# Patient Record
Sex: MALE | Race: OTHER | HISPANIC OR LATINO | URBAN - METROPOLITAN AREA
[De-identification: names, ages, dates, MRNs, and addresses within clinical notes are randomized per-mention and may not be internally consistent; named-entity substitution may affect disease eponyms.]

---

## 2020-07-22 ENCOUNTER — EMERGENCY (EMERGENCY)
Facility: HOSPITAL | Age: 30
LOS: 1 days | Discharge: ANOTHER HOSPITAL | End: 2020-07-22
Attending: EMERGENCY MEDICINE | Admitting: EMERGENCY MEDICINE
Payer: MEDICAID

## 2020-07-22 VITALS
OXYGEN SATURATION: 96 % | DIASTOLIC BLOOD PRESSURE: 91 MMHG | HEIGHT: 67 IN | TEMPERATURE: 98 F | SYSTOLIC BLOOD PRESSURE: 143 MMHG | WEIGHT: 125 LBS | HEART RATE: 77 BPM | RESPIRATION RATE: 18 BRPM

## 2020-07-22 DIAGNOSIS — R11.2 NAUSEA WITH VOMITING, UNSPECIFIED: ICD-10-CM

## 2020-07-22 DIAGNOSIS — F29 UNSPECIFIED PSYCHOSIS NOT DUE TO A SUBSTANCE OR KNOWN PHYSIOLOGICAL CONDITION: ICD-10-CM

## 2020-07-22 DIAGNOSIS — Z20.828 CONTACT WITH AND (SUSPECTED) EXPOSURE TO OTHER VIRAL COMMUNICABLE DISEASES: ICD-10-CM

## 2020-07-22 DIAGNOSIS — R10.13 EPIGASTRIC PAIN: ICD-10-CM

## 2020-07-22 DIAGNOSIS — F19.10 OTHER PSYCHOACTIVE SUBSTANCE ABUSE, UNCOMPLICATED: ICD-10-CM

## 2020-07-22 DIAGNOSIS — F48.9 NONPSYCHOTIC MENTAL DISORDER, UNSPECIFIED: ICD-10-CM

## 2020-07-22 DIAGNOSIS — R10.9 UNSPECIFIED ABDOMINAL PAIN: ICD-10-CM

## 2020-07-22 LAB
ALBUMIN SERPL ELPH-MCNC: 4.4 G/DL — SIGNIFICANT CHANGE UP (ref 3.4–5)
ALP SERPL-CCNC: 59 U/L — SIGNIFICANT CHANGE UP (ref 40–120)
ALT FLD-CCNC: 18 U/L — SIGNIFICANT CHANGE UP (ref 12–42)
ANION GAP SERPL CALC-SCNC: 12 MMOL/L — SIGNIFICANT CHANGE UP (ref 9–16)
APAP SERPL-MCNC: <2 UG/ML — LOW (ref 10–30)
AST SERPL-CCNC: 19 U/L — SIGNIFICANT CHANGE UP (ref 15–37)
BASOPHILS # BLD AUTO: 0.05 K/UL — SIGNIFICANT CHANGE UP (ref 0–0.2)
BASOPHILS NFR BLD AUTO: 0.6 % — SIGNIFICANT CHANGE UP (ref 0–2)
BILIRUB SERPL-MCNC: 0.9 MG/DL — SIGNIFICANT CHANGE UP (ref 0.2–1.2)
BUN SERPL-MCNC: 12 MG/DL — SIGNIFICANT CHANGE UP (ref 7–23)
CALCIUM SERPL-MCNC: 9.3 MG/DL — SIGNIFICANT CHANGE UP (ref 8.5–10.5)
CHLORIDE SERPL-SCNC: 104 MMOL/L — SIGNIFICANT CHANGE UP (ref 96–108)
CK SERPL-CCNC: 321 U/L — HIGH (ref 39–308)
CO2 SERPL-SCNC: 26 MMOL/L — SIGNIFICANT CHANGE UP (ref 22–31)
CREAT SERPL-MCNC: 1 MG/DL — SIGNIFICANT CHANGE UP (ref 0.5–1.3)
EOSINOPHIL # BLD AUTO: 0.07 K/UL — SIGNIFICANT CHANGE UP (ref 0–0.5)
EOSINOPHIL NFR BLD AUTO: 0.9 % — SIGNIFICANT CHANGE UP (ref 0–6)
ETHANOL SERPL-MCNC: 5 MG/DL — HIGH
GLUCOSE SERPL-MCNC: 77 MG/DL — SIGNIFICANT CHANGE UP (ref 70–99)
HCT VFR BLD CALC: 43.9 % — SIGNIFICANT CHANGE UP (ref 39–50)
HGB BLD-MCNC: 14.9 G/DL — SIGNIFICANT CHANGE UP (ref 13–17)
IMM GRANULOCYTES NFR BLD AUTO: 0.3 % — SIGNIFICANT CHANGE UP (ref 0–1.5)
LIDOCAIN IGE QN: 291 U/L — SIGNIFICANT CHANGE UP (ref 73–393)
LYMPHOCYTES # BLD AUTO: 1.37 K/UL — SIGNIFICANT CHANGE UP (ref 1–3.3)
LYMPHOCYTES # BLD AUTO: 17.5 % — SIGNIFICANT CHANGE UP (ref 13–44)
MCHC RBC-ENTMCNC: 27.4 PG — SIGNIFICANT CHANGE UP (ref 27–34)
MCHC RBC-ENTMCNC: 33.9 GM/DL — SIGNIFICANT CHANGE UP (ref 32–36)
MCV RBC AUTO: 80.8 FL — SIGNIFICANT CHANGE UP (ref 80–100)
MONOCYTES # BLD AUTO: 0.74 K/UL — SIGNIFICANT CHANGE UP (ref 0–0.9)
MONOCYTES NFR BLD AUTO: 9.5 % — SIGNIFICANT CHANGE UP (ref 2–14)
NEUTROPHILS # BLD AUTO: 5.58 K/UL — SIGNIFICANT CHANGE UP (ref 1.8–7.4)
NEUTROPHILS NFR BLD AUTO: 71.2 % — SIGNIFICANT CHANGE UP (ref 43–77)
NRBC # BLD: 0 /100 WBCS — SIGNIFICANT CHANGE UP (ref 0–0)
PCP SPEC-MCNC: SIGNIFICANT CHANGE UP
PLATELET # BLD AUTO: 307 K/UL — SIGNIFICANT CHANGE UP (ref 150–400)
POTASSIUM SERPL-MCNC: 3.8 MMOL/L — SIGNIFICANT CHANGE UP (ref 3.5–5.3)
POTASSIUM SERPL-SCNC: 3.8 MMOL/L — SIGNIFICANT CHANGE UP (ref 3.5–5.3)
PROT SERPL-MCNC: 7.4 G/DL — SIGNIFICANT CHANGE UP (ref 6.4–8.2)
RBC # BLD: 5.43 M/UL — SIGNIFICANT CHANGE UP (ref 4.2–5.8)
RBC # FLD: 12.8 % — SIGNIFICANT CHANGE UP (ref 10.3–14.5)
SALICYLATES SERPL-MCNC: 0.6 MG/DL — LOW (ref 2.8–20)
SODIUM SERPL-SCNC: 142 MMOL/L — SIGNIFICANT CHANGE UP (ref 132–145)
TSH SERPL-MCNC: 2.36 UIU/ML — SIGNIFICANT CHANGE UP (ref 0.36–3.74)
WBC # BLD: 7.83 K/UL — SIGNIFICANT CHANGE UP (ref 3.8–10.5)
WBC # FLD AUTO: 7.83 K/UL — SIGNIFICANT CHANGE UP (ref 3.8–10.5)

## 2020-07-22 PROCEDURE — 99285 EMERGENCY DEPT VISIT HI MDM: CPT

## 2020-07-22 PROCEDURE — 93010 ELECTROCARDIOGRAM REPORT: CPT

## 2020-07-22 PROCEDURE — 70450 CT HEAD/BRAIN W/O DYE: CPT | Mod: 26

## 2020-07-22 PROCEDURE — 90792 PSYCH DIAG EVAL W/MED SRVCS: CPT | Mod: 95

## 2020-07-22 RX ORDER — RISPERIDONE 4 MG/1
1 TABLET ORAL ONCE
Refills: 0 | Status: COMPLETED | OUTPATIENT
Start: 2020-07-22 | End: 2020-07-22

## 2020-07-22 RX ORDER — NICOTINE POLACRILEX 2 MG
1 GUM BUCCAL ONCE
Refills: 0 | Status: COMPLETED | OUTPATIENT
Start: 2020-07-22 | End: 2020-07-22

## 2020-07-22 RX ORDER — HALOPERIDOL DECANOATE 100 MG/ML
5 INJECTION INTRAMUSCULAR ONCE
Refills: 0 | Status: DISCONTINUED | OUTPATIENT
Start: 2020-07-22 | End: 2020-07-26

## 2020-07-22 RX ORDER — HALOPERIDOL DECANOATE 100 MG/ML
5 INJECTION INTRAMUSCULAR ONCE
Refills: 0 | Status: COMPLETED | OUTPATIENT
Start: 2020-07-22 | End: 2020-07-22

## 2020-07-22 RX ADMIN — Medication 1 PATCH: at 18:55

## 2020-07-22 RX ADMIN — Medication 2 MILLIGRAM(S): at 18:33

## 2020-07-22 RX ADMIN — HALOPERIDOL DECANOATE 5 MILLIGRAM(S): 100 INJECTION INTRAMUSCULAR at 18:33
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
## 2020-07-22 NOTE — ED BEHAVIORAL HEALTH ASSESSMENT NOTE - DESCRIPTION
per RN Yonatan, pt has been under care of number of nurses during ED course and has been under care of collateral for 2 hrs. States pt is very distrustful of staff, stated he was "afraid", suspicious of meds, not cooperative with physical exams, not comfortable around men (gets agitated and defensive when other men are in the room), nonsensical speech. Patient refused covid test, became agitated and recieved haldol 5mg/ativan 2mg IM.  Refused risperdal PO, accused RN of "trying to make me pass out", "last time I accepted a medication from you I was out for 5 days". No evidence of substance intox/withdrawal noted.    Utox +THC.    Vital Signs Last 24 Hrs  T(C): 37.1 (22 Jul 2020 17:14), Max: 37.1 (22 Jul 2020 17:14)  T(F): 98.7 (22 Jul 2020 17:14), Max: 98.7 (22 Jul 2020 17:14)  HR: 60 (22 Jul 2020 17:14) (60 - 77)  BP: 113/68 (22 Jul 2020 17:14) (113/68 - 143/91)  BP(mean): --  RR: 16 (22 Jul 2020 17:14) (16 - 18)  SpO2: 97% (22 Jul 2020 17:14) (96% - 97%) denies see HPI

## 2020-07-22 NOTE — ED ADULT NURSE NOTE - CHIEF COMPLAINT QUOTE
Patient arrives via EMS complaining of abdominal pain, nausea/vomiting, admits to alcohol use this morning (two beers) and taking several tablets of cough suppressants chlorpheniramine and dextromethorphan (approximately 16 tabs) patient with unsteady gait, was at [facility] when he was picked up

## 2020-07-22 NOTE — ED BEHAVIORAL HEALTH ASSESSMENT NOTE - ACTIVATING EVENTS/STRESSORS
Non-compliant or not receiving treatment/Inadequate social supports/Pending incarceration or homelessness/Current or pending social isolation

## 2020-07-22 NOTE — ED BEHAVIORAL HEALTH ASSESSMENT NOTE - PSYCHIATRIC ISSUES AND PLAN (INCLUDE STANDING AND PRN MEDICATION)
Start Risperdal 1mg BID. Haldol 5mg/ Ativan 2mg PO/IM q6h PRN severe agitation. If QTc <500 can start Risperdal 1mg BID. Haldol 5mg/ Ativan 2mg PO/IM q6h PRN severe agitation if CPK downtrending. Ativan 2mg/ Benadryl 50mg PO/IM q6h PRN agitation if CPK uptrending.

## 2020-07-22 NOTE — ED PROVIDER NOTE - SHIFT CHANGE DETAILS
Sign out for pending psych hospital placement 2/2 COVID-19 results. Called lab at 9:49pm who confirms they are running COVID-19 swab as a STAT lab with prelim result coming soon.
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
## 2020-07-22 NOTE — ED PROVIDER NOTE - NS ED MD SHIFT CHANGE PLANNED DISPOSITION
transfer to another facility for specialty care/pending test results documented on template/transfer to another facility, per patient's request

## 2020-07-22 NOTE — ED PROVIDER NOTE - CLINICAL SUMMARY MEDICAL DECISION MAKING FREE TEXT BOX
29 y/o male exhibiting AH and VH in context of recent ingestion of "pills" which contained chlorpheniramine and dextromethorphan. 16 doses believed to have been ingested. Will get EKG to observe for QT prolongation, toxicology labs, and psych if needed. Patient now feels better. Will observe for clinical sobriety. Patient does not exhibit any signs of hyperthermia, tachycardia, palpitations, or diaphoresis to benzos but with monitor for other signs of toxicity. Patient did not vomit currently. Given chlorpheniramine has acetaminophen toxicity, will screen for acetaminophen toxicity. 31 y/o male exhibiting AH and VH in context of presumed recent ingestion of 16x Coricidin which contains chlorpheniramine and dextromethorphan. EKG NSR with no signs of TdP or QT prolongation, tox labs unremarkable, and PCC recommending supportive care and observation with telepsych afterwards. Patient now feels better and tolerating PO. Will observe for clinical sobriety. Patient does not exhibit any signs of hyperthermia, tachycardia, palpitations, or diaphoresis to warrant intervention with benzo, but with monitor for other signs of toxicity. Patient did not vomit in the ED and has been continually drinking water and fluids.
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
## 2020-07-22 NOTE — ED ADULT TRIAGE NOTE - CHIEF COMPLAINT QUOTE
Patient arrives via EMS complaining of abdominal pain, nausea/vomiting, admits to alcohol use this morning (two beers) and taking several tablets of cough suppressants (approximately 16 tabs) patient with unsteady gait, was at [facility] when he was picked up Patient arrives via EMS complaining of abdominal pain, nausea/vomiting, admits to alcohol use this morning (two beers) and taking several tablets of cough suppressants chlorpheniramine and dextromethorphan (approximately 16 tabs) patient with unsteady gait, was at [facility] when he was picked up

## 2020-07-22 NOTE — ED BEHAVIORAL HEALTH ASSESSMENT NOTE - OTHER
multiple tattoos suspicious of writer and telepsych device at times undomiciled see HPI help seeking loose at times suspected SI/HI tactile hallucination suspected of rectum and testicles, see HPI external
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
## 2020-07-22 NOTE — ED BEHAVIORAL HEALTH ASSESSMENT NOTE - MEDICAL ISSUES AND PLAN (INCLUDE STANDING AND PRN MEDICATION)
f/u head CT scan f/u head CT scan. CPK mildly elevated, would trend given pt's recent agitation and administration of haldol.

## 2020-07-22 NOTE — ED BEHAVIORAL HEALTH ASSESSMENT NOTE - DIFFERENTIAL
psychosis not otherwise specified r/o primary psychotic disorder vs substance induced  r/o bipolar disorder

## 2020-07-22 NOTE — ED ADULT NURSE NOTE - NSIMPLEMENTINTERV_GEN_ALL_ED
Implemented All Fall with Harm Risk Interventions:  [place] to call system. Call bell, personal items and telephone within reach. Instruct patient to call for assistance. Room bathroom lighting operational. Non-slip footwear when patient is off stretcher. Physically safe environment: no spills, clutter or unnecessary equipment. Stretcher in lowest position, wheels locked, appropriate side rails in place. Provide visual cue, wrist band, yellow gown, etc. Monitor gait and stability. Monitor for mental status changes and reorient to person, place, and time. Review medications for side effects contributing to fall risk. Reinforce activity limits and safety measures with patient and family. Provide visual clues: red socks.

## 2020-07-22 NOTE — ED BEHAVIORAL HEALTH ASSESSMENT NOTE - VIOLENCE RISK FACTORS:
Noncompliance with treatment/Community stressors that increase the risk of destabilization/Substance abuse/Impulsivity/History of being victimized/traumatized/Command hallucinations for violent behavior

## 2020-07-22 NOTE — ED BEHAVIORAL HEALTH ASSESSMENT NOTE - AXIS IV
Problems with primary support/Economic problems/Problem related to social environment/Occupational problems/Housing problems

## 2020-07-22 NOTE — ED BEHAVIORAL HEALTH ASSESSMENT NOTE - LEGAL HISTORY
states that he is dealing with legal issues in [place]?- states that he and his ex-fiance went to [place] to have a baby but that she "fled with the child" after he was born. Patient not forthcoming with questions about CPS involvement

## 2020-07-22 NOTE — ED ADULT NURSE NOTE - OBJECTIVE STATEMENT
29 yo M presents to ED for AMS. Pt state "I was drinking and then someone told me to take pills because it would be better than drinking and then I got sweaty and dizzy and vomited a lot". Pt denies CP, SOB at this time. Fall precautions initiated. Pt has no sign or respiratory distress noted or sign of trauma noted to head or body.

## 2020-07-22 NOTE — ED BEHAVIORAL HEALTH ASSESSMENT NOTE - SUICIDE RISK FACTORS
Command hallucinations/Alcohol/Substance abuse disorders/Impulsivity/Access to lethal methods (pills, firearm, etc.: Ask specifically about presence or absence of a firearm in the home or ease of accessing/Agitation/Severe Anxiety/Panic/Unable to engage in safety planning/History of abuse/trauma/Insomnia
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
## 2020-07-22 NOTE — ED PROVIDER NOTE - PROGRESS NOTE DETAILS
Called toxicology center. Recommended to monitor and observe for the next 2-4 hours. Pt signed out by Dr Lipscomb, pending admission to Psychiatry for psychosis symptoms. Legals were filled out by Dr Lipscomb. Called Psych team for update, they are looking for bed locations for pt, no male beds tonight. Likely to be signed out in AM pending bed assignment. COVID result also not available as reagent is scarce right now. new FS borderline, will provide food and drink. Pt awake and able to eat/drink.

## 2020-07-23 ENCOUNTER — INPATIENT (INPATIENT)
Facility: HOSPITAL | Age: 30
LOS: 7 days | Discharge: HOME | End: 2020-07-31
Attending: PSYCHIATRY & NEUROLOGY | Admitting: PSYCHIATRY & NEUROLOGY
Payer: MEDICAID

## 2020-07-23 VITALS — WEIGHT: 134.92 LBS | TEMPERATURE: 98 F | HEIGHT: 66 IN

## 2020-07-23 VITALS
OXYGEN SATURATION: 98 % | TEMPERATURE: 98 F | DIASTOLIC BLOOD PRESSURE: 79 MMHG | RESPIRATION RATE: 16 BRPM | SYSTOLIC BLOOD PRESSURE: 120 MMHG | HEART RATE: 83 BPM

## 2020-07-23 DIAGNOSIS — F29 UNSPECIFIED PSYCHOSIS NOT DUE TO A SUBSTANCE OR KNOWN PHYSIOLOGICAL CONDITION: ICD-10-CM

## 2020-07-23 LAB
LITHIUM SERPL-MCNC: <0.2 MMOL/L — LOW (ref 0.6–1.2)
SARS-COV-2 RNA SPEC QL NAA+PROBE: SIGNIFICANT CHANGE UP

## 2020-07-23 PROCEDURE — 93010 ELECTROCARDIOGRAM REPORT: CPT

## 2020-07-23 RX ORDER — DIPHENHYDRAMINE HCL 50 MG
50 CAPSULE ORAL ONCE
Refills: 0 | Status: DISCONTINUED | OUTPATIENT
Start: 2020-07-23 | End: 2020-07-31

## 2020-07-23 RX ORDER — THIAMINE MONONITRATE (VIT B1) 100 MG
100 TABLET ORAL DAILY
Refills: 0 | Status: COMPLETED | OUTPATIENT
Start: 2020-07-23 | End: 2020-07-26

## 2020-07-23 RX ORDER — RISPERIDONE 4 MG/1
1 TABLET ORAL
Refills: 0 | Status: DISCONTINUED | OUTPATIENT
Start: 2020-07-23 | End: 2020-07-24

## 2020-07-23 RX ORDER — NICOTINE POLACRILEX 2 MG
2 GUM BUCCAL
Refills: 0 | Status: DISCONTINUED | OUTPATIENT
Start: 2020-07-23 | End: 2020-07-31

## 2020-07-23 RX ORDER — FOLIC ACID 0.8 MG
1 TABLET ORAL DAILY
Refills: 0 | Status: COMPLETED | OUTPATIENT
Start: 2020-07-23 | End: 2020-07-30

## 2020-07-23 RX ORDER — HALOPERIDOL DECANOATE 100 MG/ML
5 INJECTION INTRAMUSCULAR ONCE
Refills: 0 | Status: DISCONTINUED | OUTPATIENT
Start: 2020-07-23 | End: 2020-07-31

## 2020-07-23 RX ORDER — DIPHENHYDRAMINE HCL 50 MG
50 CAPSULE ORAL EVERY 6 HOURS
Refills: 0 | Status: DISCONTINUED | OUTPATIENT
Start: 2020-07-23 | End: 2020-07-31

## 2020-07-23 RX ORDER — HALOPERIDOL DECANOATE 100 MG/ML
5 INJECTION INTRAMUSCULAR EVERY 6 HOURS
Refills: 0 | Status: DISCONTINUED | OUTPATIENT
Start: 2020-07-23 | End: 2020-07-31

## 2020-07-23 RX ADMIN — Medication 2 MILLIGRAM(S): at 17:41

## 2020-07-23 RX ADMIN — RISPERIDONE 1 MILLIGRAM(S): 4 TABLET ORAL at 21:27

## 2020-07-23 NOTE — ED ADULT NURSE REASSESSMENT NOTE - NS ED NURSE REASSESS COMMENT FT1
EMS at bedside to transfer pt to [facility]. Belongings given to EMS by Security. Pt calm at this time. NO IV in place. Telepsych called for [place].
Patient oriented and informed about the necessity to do the swab test (covid-19) but pt. refused. MD. made aware PT given 5 mg haloperidol + 2 mg lorazepam for anxiety. Pt 1/1 outside the room.
Patient sleeping in stretcher. Patient is calm, cooperative, in nad. Pending AM telepsych eval, 1:1 observation maintained.
Pt UA and Utox rvwd.  Pt pending psych consult.
Pt awaiting telepsych consult.  Pt A&Ox3, NAD.
Pt telepsych consult at bedside.  PCT Cisyln at bedside as per protocol.
RN spoke w/ admitting psych team.  Pt asleep on stretcher, arousable to RN questions.  One-to-one in place.  Will continue to reassess and reevaluate.
Received patient from TAYLOR Conte. Patient is currently sleeping in stretcher, in nad, respirations even bilaterally. Not endorsing any complaints at this time, 1:1 observation and safety measures in place. Pending bed for admission, will continue to monitor.
Report received by Nhung then check the patient at the room 07. Pt. no complaints at this moment, no apparent distress Pt. A&0x3, no fever, no pain. Explain for the patient that he need to do the urine test. Pt. remains on observation under nurses care.
This RN is assuming care from TAYLOR Quintero. Patient is resting in bed in NAD. 1 to 1 supervision is at door in direct eye sight of patient. Will continue to assess.
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
## 2020-07-23 NOTE — CHART NOTE - NSCHARTNOTEFT_GEN_A_CORE
Screening Medical Evaluation  Patient Admitted from: [facility] admitting diagnosis: Non-organic psychosis    PAST MEDICAL & SURGICAL HISTORY:        Allergies    No Known Allergies    Intolerances        Social History:     FAMILY HISTORY:      MEDICATIONS  (STANDING):  folic acid 1 milliGRAM(s) Oral daily  multivitamin 1 Tablet(s) Oral daily  risperiDONE   Tablet 1 milliGRAM(s) Oral two times a day  thiamine 100 milliGRAM(s) Oral daily    MEDICATIONS  (PRN):  diphenhydrAMINE 50 milliGRAM(s) Oral every 6 hours PRN agitation/EPS prophylaxis  diphenhydrAMINE   Injectable 50 milliGRAM(s) IntraMuscular once PRN severe agitation/EPS prophylaxis  haloperidol     Tablet 5 milliGRAM(s) Oral every 6 hours PRN agitation  haloperidol    Injectable 5 milliGRAM(s) IntraMuscular once PRN severe agitation  LORazepam     Tablet 2 milliGRAM(s) Oral every 6 hours PRN agitation  LORazepam     Tablet 2 milliGRAM(s) Oral every 2 hours PRN CIWA score increase by 2 points and current CIWA score GREATER THAN 9  LORazepam   Injectable 2 milliGRAM(s) IntraMuscular once PRN severe agitation  nicotine  Polacrilex Gum 2 milliGRAM(s) Oral every 3 hours PRN nicotine craving      Vital Signs Last 24 Hrs  T(C): 36.8 (23 Jul 2020 18:57), Max: 36.8 (23 Jul 2020 18:57)  T(F): 98.3 (23 Jul 2020 18:57), Max: 98.3 (23 Jul 2020 18:57)  HR: -- 86  BP: -- 109/78  BP(mean): --  RR: --  SpO2: --  CAPILLARY BLOOD GLUCOSE            PHYSICAL EXAM:  GENERAL: NAD, well-developed  HEAD:  Atraumatic, Normocephalic  EYES: EOMI, PERRLA, conjunctiva and sclera clear  NECK: Supple, No JVD  CHEST/LUNG: Clear to auscultation bilaterally; No wheeze  HEART: Regular rate and rhythm; No murmurs, rubs, or gallops  ABDOMEN: Soft, Nontender, Nondistended; Bowel sounds present  EXTREMITIES:  2+ Peripheral Pulses, No clubbing, cyanosis, or edema  PSYCH: AAOx3  NEUROLOGY: non-focal  SKIN: In tact    LABS:                    RADIOLOGY & ADDITIONAL TESTS:    Assessment and Plan: 31 yo M with no significant PMH is admitted to [facility] with a primary psychiatric diagnosis of Non-organic psychosis. The pt currently denies having any medical complaints such as chest pain, sob, abdominal pain, n/v/d/c, or any problems with urination or bowel movements. The rest of his screening physical is unremarkable.    1.Non-organic psychosis-Plan: continue with meds as per primary psychiatric team

## 2020-07-24 DIAGNOSIS — M62.82 RHABDOMYOLYSIS: ICD-10-CM

## 2020-07-24 DIAGNOSIS — F28 OTHER PSYCHOTIC DISORDER NOT DUE TO A SUBSTANCE OR KNOWN PHYSIOLOGICAL CONDITION: ICD-10-CM

## 2020-07-24 LAB
CHOLEST SERPL-MCNC: 158 MG/DL — SIGNIFICANT CHANGE UP (ref 120–199)
CK SERPL-CCNC: 1010 U/L — HIGH (ref 30–200)
HBA1C BLD-MCNC: 5.7 % — HIGH (ref 4–5.6)
HDLC SERPL-MCNC: 38 MG/DL — SIGNIFICANT CHANGE UP (ref 35–55)
LIPID PNL WITH DIRECT LDL SERPL: 105 MG/DL — SIGNIFICANT CHANGE UP
TRIGL SERPL-MCNC: 87 MG/DL — SIGNIFICANT CHANGE UP (ref 10–149)

## 2020-07-24 PROCEDURE — 99223 1ST HOSP IP/OBS HIGH 75: CPT

## 2020-07-24 PROCEDURE — 99232 SBSQ HOSP IP/OBS MODERATE 35: CPT | Mod: GC

## 2020-07-24 RX ORDER — RISPERIDONE 4 MG/1
2 TABLET ORAL AT BEDTIME
Refills: 0 | Status: DISCONTINUED | OUTPATIENT
Start: 2020-07-24 | End: 2020-07-28

## 2020-07-24 RX ORDER — RISPERIDONE 4 MG/1
1 TABLET ORAL DAILY
Refills: 0 | Status: DISCONTINUED | OUTPATIENT
Start: 2020-07-24 | End: 2020-07-26

## 2020-07-24 RX ADMIN — Medication 100 MILLIGRAM(S): at 08:52

## 2020-07-24 RX ADMIN — Medication 2 MILLIGRAM(S): at 16:27

## 2020-07-24 RX ADMIN — Medication 1 TABLET(S): at 08:52

## 2020-07-24 RX ADMIN — RISPERIDONE 2 MILLIGRAM(S): 4 TABLET ORAL at 20:10

## 2020-07-24 RX ADMIN — RISPERIDONE 1 MILLIGRAM(S): 4 TABLET ORAL at 08:52

## 2020-07-24 RX ADMIN — Medication 1 MILLIGRAM(S): at 08:52

## 2020-07-24 NOTE — CONSULT NOTE ADULT - ASSESSMENT
30M with psychosis found to have uptrending cpk likely due to walking for several hours prior to hospitalization.

## 2020-07-25 LAB
ANION GAP SERPL CALC-SCNC: 9 MMO/L — SIGNIFICANT CHANGE UP (ref 7–14)
BUN SERPL-MCNC: 12 MG/DL — SIGNIFICANT CHANGE UP (ref 7–23)
CALCIUM SERPL-MCNC: 8.8 MG/DL — SIGNIFICANT CHANGE UP (ref 8.4–10.5)
CHLORIDE SERPL-SCNC: 99 MMOL/L — SIGNIFICANT CHANGE UP (ref 98–107)
CK SERPL-CCNC: 662 U/L — HIGH (ref 30–200)
CO2 SERPL-SCNC: 29 MMOL/L — SIGNIFICANT CHANGE UP (ref 22–31)
CREAT SERPL-MCNC: 0.95 MG/DL — SIGNIFICANT CHANGE UP (ref 0.5–1.3)
GLUCOSE SERPL-MCNC: 145 MG/DL — HIGH (ref 70–99)
POTASSIUM SERPL-MCNC: 3.8 MMOL/L — SIGNIFICANT CHANGE UP (ref 3.5–5.3)
POTASSIUM SERPL-SCNC: 3.8 MMOL/L — SIGNIFICANT CHANGE UP (ref 3.5–5.3)
SODIUM SERPL-SCNC: 137 MMOL/L — SIGNIFICANT CHANGE UP (ref 135–145)

## 2020-07-25 PROCEDURE — 99232 SBSQ HOSP IP/OBS MODERATE 35: CPT

## 2020-07-25 RX ORDER — NICOTINE POLACRILEX 2 MG
1 GUM BUCCAL ONCE
Refills: 0 | Status: COMPLETED | OUTPATIENT
Start: 2020-07-25 | End: 2020-07-25

## 2020-07-25 RX ORDER — NICOTINE POLACRILEX 2 MG
1 GUM BUCCAL DAILY
Refills: 0 | Status: DISCONTINUED | OUTPATIENT
Start: 2020-07-25 | End: 2020-07-31

## 2020-07-25 RX ORDER — DIPHENHYDRAMINE HCL 50 MG
50 CAPSULE ORAL ONCE
Refills: 0 | Status: COMPLETED | OUTPATIENT
Start: 2020-07-25 | End: 2020-07-25

## 2020-07-25 RX ORDER — DIPHENHYDRAMINE HCL 50 MG
50 CAPSULE ORAL ONCE
Refills: 0 | Status: DISCONTINUED | OUTPATIENT
Start: 2020-07-25 | End: 2020-07-27

## 2020-07-25 RX ORDER — HALOPERIDOL DECANOATE 100 MG/ML
5 INJECTION INTRAMUSCULAR ONCE
Refills: 0 | Status: DISCONTINUED | OUTPATIENT
Start: 2020-07-25 | End: 2020-07-27

## 2020-07-25 RX ADMIN — Medication 2 MILLIGRAM(S): at 12:35

## 2020-07-25 RX ADMIN — Medication 1 PATCH: at 08:17

## 2020-07-25 RX ADMIN — Medication 50 MILLIGRAM(S): at 12:36

## 2020-07-25 RX ADMIN — Medication 2 MILLIGRAM(S): at 08:12

## 2020-07-25 RX ADMIN — RISPERIDONE 2 MILLIGRAM(S): 4 TABLET ORAL at 20:12

## 2020-07-25 RX ADMIN — Medication 1 MILLIGRAM(S): at 08:02

## 2020-07-25 RX ADMIN — HALOPERIDOL DECANOATE 5 MILLIGRAM(S): 100 INJECTION INTRAMUSCULAR at 19:15

## 2020-07-25 RX ADMIN — Medication 1 PATCH: at 20:12

## 2020-07-25 RX ADMIN — Medication 50 MILLIGRAM(S): at 19:15

## 2020-07-25 RX ADMIN — Medication 1 TABLET(S): at 08:03

## 2020-07-25 RX ADMIN — Medication 2 MILLIGRAM(S): at 12:36

## 2020-07-25 RX ADMIN — Medication 2 MILLIGRAM(S): at 19:15

## 2020-07-25 RX ADMIN — Medication 50 MILLIGRAM(S): at 22:12

## 2020-07-25 RX ADMIN — Medication 100 MILLIGRAM(S): at 08:03

## 2020-07-25 RX ADMIN — RISPERIDONE 1 MILLIGRAM(S): 4 TABLET ORAL at 08:03

## 2020-07-25 RX ADMIN — HALOPERIDOL DECANOATE 5 MILLIGRAM(S): 100 INJECTION INTRAMUSCULAR at 12:36

## 2020-07-26 LAB — HIV 1+2 AB+HIV1 P24 AG SERPL QL IA: SIGNIFICANT CHANGE UP

## 2020-07-26 PROCEDURE — 99232 SBSQ HOSP IP/OBS MODERATE 35: CPT

## 2020-07-26 RX ORDER — RISPERIDONE 4 MG/1
2 TABLET ORAL DAILY
Refills: 0 | Status: DISCONTINUED | OUTPATIENT
Start: 2020-07-27 | End: 2020-07-27

## 2020-07-26 RX ORDER — IBUPROFEN 200 MG
600 TABLET ORAL EVERY 6 HOURS
Refills: 0 | Status: DISCONTINUED | OUTPATIENT
Start: 2020-07-26 | End: 2020-07-28

## 2020-07-26 RX ADMIN — Medication 2 MILLIGRAM(S): at 13:45

## 2020-07-26 RX ADMIN — HALOPERIDOL DECANOATE 5 MILLIGRAM(S): 100 INJECTION INTRAMUSCULAR at 13:43

## 2020-07-26 RX ADMIN — Medication 30 MILLILITER(S): at 01:11

## 2020-07-26 RX ADMIN — RISPERIDONE 1 MILLIGRAM(S): 4 TABLET ORAL at 09:41

## 2020-07-26 RX ADMIN — Medication 1 PATCH: at 09:43

## 2020-07-26 RX ADMIN — Medication 50 MILLIGRAM(S): at 23:00

## 2020-07-26 RX ADMIN — Medication 2 MILLIGRAM(S): at 23:00

## 2020-07-26 RX ADMIN — RISPERIDONE 2 MILLIGRAM(S): 4 TABLET ORAL at 20:40

## 2020-07-26 RX ADMIN — Medication 2 MILLIGRAM(S): at 09:42

## 2020-07-26 RX ADMIN — Medication 600 MILLIGRAM(S): at 17:21

## 2020-07-26 RX ADMIN — Medication 1 PATCH: at 09:41

## 2020-07-26 RX ADMIN — Medication 600 MILLIGRAM(S): at 16:20

## 2020-07-26 RX ADMIN — Medication 1 TABLET(S): at 09:43

## 2020-07-26 RX ADMIN — Medication 1 PATCH: at 09:42

## 2020-07-26 RX ADMIN — Medication 2 MILLIGRAM(S): at 13:44

## 2020-07-26 RX ADMIN — Medication 50 MILLIGRAM(S): at 13:43

## 2020-07-26 RX ADMIN — Medication 1 MILLIGRAM(S): at 09:43

## 2020-07-26 RX ADMIN — Medication 100 MILLIGRAM(S): at 09:41

## 2020-07-27 PROCEDURE — 99232 SBSQ HOSP IP/OBS MODERATE 35: CPT | Mod: GC

## 2020-07-27 RX ORDER — HALOPERIDOL DECANOATE 100 MG/ML
5 INJECTION INTRAMUSCULAR ONCE
Refills: 0 | Status: DISCONTINUED | OUTPATIENT
Start: 2020-07-27 | End: 2020-07-31

## 2020-07-27 RX ORDER — CYCLOBENZAPRINE HYDROCHLORIDE 10 MG/1
5 TABLET, FILM COATED ORAL ONCE
Refills: 0 | Status: COMPLETED | OUTPATIENT
Start: 2020-07-27 | End: 2020-07-27

## 2020-07-27 RX ORDER — RISPERIDONE 4 MG/1
1 TABLET ORAL DAILY
Refills: 0 | Status: DISCONTINUED | OUTPATIENT
Start: 2020-07-28 | End: 2020-07-29

## 2020-07-27 RX ORDER — DIPHENHYDRAMINE HCL 50 MG
50 CAPSULE ORAL ONCE
Refills: 0 | Status: DISCONTINUED | OUTPATIENT
Start: 2020-07-27 | End: 2020-07-31

## 2020-07-27 RX ADMIN — Medication 1 PATCH: at 09:21

## 2020-07-27 RX ADMIN — Medication 2 MILLIGRAM(S): at 09:24

## 2020-07-27 RX ADMIN — Medication 2 MILLIGRAM(S): at 00:02

## 2020-07-27 RX ADMIN — Medication 1 PATCH: at 11:28

## 2020-07-27 RX ADMIN — Medication 2 MILLIGRAM(S): at 12:40

## 2020-07-27 RX ADMIN — RISPERIDONE 2 MILLIGRAM(S): 4 TABLET ORAL at 09:28

## 2020-07-27 RX ADMIN — Medication 2 MILLIGRAM(S): at 21:30

## 2020-07-27 RX ADMIN — Medication 50 MILLIGRAM(S): at 21:30

## 2020-07-27 RX ADMIN — RISPERIDONE 2 MILLIGRAM(S): 4 TABLET ORAL at 21:18

## 2020-07-27 RX ADMIN — Medication 1 MILLIGRAM(S): at 09:21

## 2020-07-27 RX ADMIN — Medication 1 TABLET(S): at 09:21

## 2020-07-27 RX ADMIN — HALOPERIDOL DECANOATE 5 MILLIGRAM(S): 100 INJECTION INTRAMUSCULAR at 21:30

## 2020-07-27 RX ADMIN — HALOPERIDOL DECANOATE 5 MILLIGRAM(S): 100 INJECTION INTRAMUSCULAR at 00:02

## 2020-07-28 PROCEDURE — 90832 PSYTX W PT 30 MINUTES: CPT

## 2020-07-28 PROCEDURE — 99232 SBSQ HOSP IP/OBS MODERATE 35: CPT | Mod: GC

## 2020-07-28 RX ORDER — ACETAMINOPHEN 500 MG
650 TABLET ORAL EVERY 6 HOURS
Refills: 0 | Status: DISCONTINUED | OUTPATIENT
Start: 2020-07-28 | End: 2020-07-31

## 2020-07-28 RX ORDER — IBUPROFEN 200 MG
600 TABLET ORAL EVERY 6 HOURS
Refills: 0 | Status: DISCONTINUED | OUTPATIENT
Start: 2020-07-28 | End: 2020-07-31

## 2020-07-28 RX ORDER — RISPERIDONE 4 MG/1
3 TABLET ORAL AT BEDTIME
Refills: 0 | Status: DISCONTINUED | OUTPATIENT
Start: 2020-07-28 | End: 2020-07-29

## 2020-07-28 RX ADMIN — CYCLOBENZAPRINE HYDROCHLORIDE 5 MILLIGRAM(S): 10 TABLET, FILM COATED ORAL at 00:18

## 2020-07-28 RX ADMIN — RISPERIDONE 1 MILLIGRAM(S): 4 TABLET ORAL at 10:01

## 2020-07-28 RX ADMIN — Medication 1 MILLIGRAM(S): at 10:01

## 2020-07-28 RX ADMIN — Medication 1 PATCH: at 10:01

## 2020-07-28 RX ADMIN — Medication 600 MILLIGRAM(S): at 10:06

## 2020-07-28 RX ADMIN — Medication 50 MILLIGRAM(S): at 19:30

## 2020-07-28 RX ADMIN — Medication 2 MILLIGRAM(S): at 19:30

## 2020-07-28 RX ADMIN — HALOPERIDOL DECANOATE 5 MILLIGRAM(S): 100 INJECTION INTRAMUSCULAR at 19:30

## 2020-07-28 RX ADMIN — RISPERIDONE 3 MILLIGRAM(S): 4 TABLET ORAL at 20:07

## 2020-07-28 RX ADMIN — Medication 1 TABLET(S): at 10:01

## 2020-07-29 PROCEDURE — 99232 SBSQ HOSP IP/OBS MODERATE 35: CPT | Mod: GC

## 2020-07-29 RX ORDER — TUBERCULIN PURIFIED PROTEIN DERIVATIVE 5 [IU]/.1ML
5 INJECTION, SOLUTION INTRADERMAL ONCE
Refills: 0 | Status: COMPLETED | OUTPATIENT
Start: 2020-07-29 | End: 2020-07-30

## 2020-07-29 RX ORDER — RISPERIDONE 4 MG/1
3 TABLET ORAL AT BEDTIME
Refills: 0 | Status: COMPLETED | OUTPATIENT
Start: 2020-07-29 | End: 2020-07-29

## 2020-07-29 RX ORDER — RISPERIDONE 4 MG/1
4 TABLET ORAL AT BEDTIME
Refills: 0 | Status: DISCONTINUED | OUTPATIENT
Start: 2020-07-30 | End: 2020-07-31

## 2020-07-29 RX ADMIN — RISPERIDONE 3 MILLIGRAM(S): 4 TABLET ORAL at 21:08

## 2020-07-29 RX ADMIN — RISPERIDONE 1 MILLIGRAM(S): 4 TABLET ORAL at 10:41

## 2020-07-29 RX ADMIN — Medication 50 MILLIGRAM(S): at 22:10

## 2020-07-29 RX ADMIN — Medication 1 TABLET(S): at 10:41

## 2020-07-29 RX ADMIN — Medication 1 MILLIGRAM(S): at 10:41

## 2020-07-30 PROCEDURE — 90832 PSYTX W PT 30 MINUTES: CPT

## 2020-07-30 PROCEDURE — 99232 SBSQ HOSP IP/OBS MODERATE 35: CPT | Mod: GC

## 2020-07-30 RX ORDER — RISPERIDONE 4 MG/1
1 TABLET ORAL
Qty: 14 | Refills: 0
Start: 2020-07-30 | End: 2020-08-12

## 2020-07-30 RX ADMIN — Medication 1 TABLET(S): at 08:05

## 2020-07-30 RX ADMIN — RISPERIDONE 4 MILLIGRAM(S): 4 TABLET ORAL at 20:03

## 2020-07-30 RX ADMIN — Medication 1 MILLIGRAM(S): at 08:05

## 2020-07-30 RX ADMIN — TUBERCULIN PURIFIED PROTEIN DERIVATIVE 5 UNIT(S): 5 INJECTION, SOLUTION INTRADERMAL at 10:05

## 2020-07-31 VITALS — TEMPERATURE: 98 F

## 2020-07-31 PROCEDURE — 99239 HOSP IP/OBS DSCHRG MGMT >30: CPT | Mod: GC

## 2022-03-01 NOTE — ED BEHAVIORAL HEALTH ASSESSMENT NOTE - NS ED BHA HOMICIDALITY PRESENT AGGRESSION PROPERTY LIFETIME
Go for blood tests as directed. Your doctor will do lab tests at regular visits to monitor the effects of this medicine. Please follow up with your doctor and keep your health care provider appointments.
Unable to assess

## 2022-05-06 ENCOUNTER — INPATIENT (INPATIENT)
Facility: HOSPITAL | Age: 32
LOS: 2 days | Discharge: HOME | DRG: 885 | End: 2022-05-09
Attending: PSYCHIATRY & NEUROLOGY | Admitting: PSYCHIATRY & NEUROLOGY
Payer: MEDICAID

## 2022-05-06 VITALS
OXYGEN SATURATION: 98 % | SYSTOLIC BLOOD PRESSURE: 134 MMHG | DIASTOLIC BLOOD PRESSURE: 65 MMHG | TEMPERATURE: 98 F | HEIGHT: 67 IN | HEART RATE: 70 BPM | RESPIRATION RATE: 18 BRPM

## 2022-05-06 DIAGNOSIS — F14.10 COCAINE ABUSE, UNCOMPLICATED: ICD-10-CM

## 2022-05-06 LAB
ALBUMIN SERPL ELPH-MCNC: 4.2 G/DL — SIGNIFICANT CHANGE UP (ref 3.4–5)
ALP SERPL-CCNC: 53 U/L — SIGNIFICANT CHANGE UP (ref 40–120)
ALT FLD-CCNC: 28 U/L — SIGNIFICANT CHANGE UP (ref 12–42)
AMPHET UR-MCNC: NEGATIVE — SIGNIFICANT CHANGE UP
ANION GAP SERPL CALC-SCNC: 7 MMOL/L — LOW (ref 9–16)
AST SERPL-CCNC: 25 U/L — SIGNIFICANT CHANGE UP (ref 15–37)
BARBITURATES UR SCN-MCNC: NEGATIVE — SIGNIFICANT CHANGE UP
BENZODIAZ UR-MCNC: NEGATIVE — SIGNIFICANT CHANGE UP
BILIRUB SERPL-MCNC: 0.6 MG/DL — SIGNIFICANT CHANGE UP (ref 0.2–1.2)
BUN SERPL-MCNC: 13 MG/DL — SIGNIFICANT CHANGE UP (ref 7–23)
CALCIUM SERPL-MCNC: 9 MG/DL — SIGNIFICANT CHANGE UP (ref 8.5–10.5)
CHLORIDE SERPL-SCNC: 100 MMOL/L — SIGNIFICANT CHANGE UP (ref 96–108)
CO2 SERPL-SCNC: 32 MMOL/L — HIGH (ref 22–31)
COCAINE METAB.OTHER UR-MCNC: POSITIVE
CREAT SERPL-MCNC: 1.01 MG/DL — SIGNIFICANT CHANGE UP (ref 0.5–1.3)
EGFR: 101 ML/MIN/1.73M2 — SIGNIFICANT CHANGE UP
ETHANOL SERPL-MCNC: <3 MG/DL — SIGNIFICANT CHANGE UP
GLUCOSE SERPL-MCNC: 83 MG/DL — SIGNIFICANT CHANGE UP (ref 70–99)
HCT VFR BLD CALC: 44.3 % — SIGNIFICANT CHANGE UP (ref 39–50)
HGB BLD-MCNC: 14.5 G/DL — SIGNIFICANT CHANGE UP (ref 13–17)
MCHC RBC-ENTMCNC: 27.6 PG — SIGNIFICANT CHANGE UP (ref 27–34)
MCHC RBC-ENTMCNC: 32.7 GM/DL — SIGNIFICANT CHANGE UP (ref 32–36)
MCV RBC AUTO: 84.4 FL — SIGNIFICANT CHANGE UP (ref 80–100)
METHADONE UR-MCNC: NEGATIVE — SIGNIFICANT CHANGE UP
NRBC # BLD: 0 /100 WBCS — SIGNIFICANT CHANGE UP (ref 0–0)
OPIATES UR-MCNC: NEGATIVE — SIGNIFICANT CHANGE UP
PCP SPEC-MCNC: SIGNIFICANT CHANGE UP
PCP UR-MCNC: NEGATIVE — SIGNIFICANT CHANGE UP
PLATELET # BLD AUTO: 294 K/UL — SIGNIFICANT CHANGE UP (ref 150–400)
POTASSIUM SERPL-MCNC: 3.8 MMOL/L — SIGNIFICANT CHANGE UP (ref 3.5–5.3)
POTASSIUM SERPL-SCNC: 3.8 MMOL/L — SIGNIFICANT CHANGE UP (ref 3.5–5.3)
PROT SERPL-MCNC: 7.3 G/DL — SIGNIFICANT CHANGE UP (ref 6.4–8.2)
RBC # BLD: 5.25 M/UL — SIGNIFICANT CHANGE UP (ref 4.2–5.8)
RBC # FLD: 13.2 % — SIGNIFICANT CHANGE UP (ref 10.3–14.5)
SARS-COV-2 RNA SPEC QL NAA+PROBE: SIGNIFICANT CHANGE UP
SODIUM SERPL-SCNC: 139 MMOL/L — SIGNIFICANT CHANGE UP (ref 132–145)
THC UR QL: POSITIVE
WBC # BLD: 8.12 K/UL — SIGNIFICANT CHANGE UP (ref 3.8–10.5)
WBC # FLD AUTO: 8.12 K/UL — SIGNIFICANT CHANGE UP (ref 3.8–10.5)

## 2022-05-06 PROCEDURE — 99285 EMERGENCY DEPT VISIT HI MDM: CPT

## 2022-05-06 PROCEDURE — 90792 PSYCH DIAG EVAL W/MED SRVCS: CPT | Mod: 95

## 2022-05-06 RX ORDER — HALOPERIDOL DECANOATE 100 MG/ML
5 INJECTION INTRAMUSCULAR EVERY 6 HOURS
Refills: 0 | Status: DISCONTINUED | OUTPATIENT
Start: 2022-05-06 | End: 2022-05-09

## 2022-05-06 RX ORDER — OLANZAPINE 15 MG/1
5 TABLET, FILM COATED ORAL ONCE
Refills: 0 | Status: COMPLETED | OUTPATIENT
Start: 2022-05-06 | End: 2022-05-07

## 2022-05-06 RX ORDER — DIPHENHYDRAMINE HCL 50 MG
50 CAPSULE ORAL EVERY 6 HOURS
Refills: 0 | Status: DISCONTINUED | OUTPATIENT
Start: 2022-05-06 | End: 2022-05-09

## 2022-05-06 RX ORDER — OLANZAPINE 15 MG/1
5 TABLET, FILM COATED ORAL ONCE
Refills: 0 | Status: COMPLETED | OUTPATIENT
Start: 2022-05-06 | End: 2022-05-06

## 2022-05-06 RX ADMIN — OLANZAPINE 5 MILLIGRAM(S): 15 TABLET, FILM COATED ORAL at 12:15

## 2022-05-06 NOTE — ED PROVIDER NOTE - NS ED ROS FT
· CONSTITUTIONAL: no fever and no chills.  · EYES: no visual changes  · ENMT: no change/loss in taste  · CARDIOVASCULAR: no cp  · RESPIRATORY: no sob,   · GASTROINTESTINAL: no pain, no nvd  · MUSCULOSKELETAL: no pain  · SKIN: no bleeding  · ENDOCRINE: no DM  · NEURO: no headache  · PSYCH: AH, depression, paranoia

## 2022-05-06 NOTE — ED BEHAVIORAL HEALTH NOTE - BEHAVIORAL HEALTH NOTE
Patient seen as part of re-evaluation.    Patient is a 33 y/o AA M, per 2020 notes single, noncaregiver, undomiciled, unemployed, at that time 1 yr old child in custody of his ex-fiance, with PPhx unspecified anxiety disorder, polysubstance use (LSD, MDMA, psilocybin, MJ, ETOH, cocaine, dextromethorphan), last known admission at [facility] in 020, in 2020 reported prior suicide attempts (cut wrists a few years ago, attempted hanging at age 7), +hx of aggression including on unit during last admission (threw chairs), reported legal problems in [place]? in 2020, but denies arrests in the [place], and denies significant PMhx consult called to evaluate AH.     The patient was given 5mg Zyprexa by mouth on 5/6/2022 at 12:01PM.    On re-evaluation, the patient was initially calm, inappropriately smiling, gesturing to people outside the room. During the course of the interview, he became increasingly agitated, requesting to leave, refusing to answer all questions.    The patient states that for the past several days, he has been using cocaine, mushrooms, weed. Yesterday he was feeling "nervous, scared, weak, inferior, inadequate, and targeted." When asked to clarify, he became very vague, saying that "it should be in the chart." He admitted to being hospitalized at [facility] and found it helpful. He used to take Risperdal but stopped "a long time ago" and that he found it helpful when he used to take it. In between answering questions, he would make comments about feeling people outside were all connected and that he didn't trust anyone. He said that his felings were "colliding with thoughts" and "creating a reality different from everyone else's." He denied any AVH but does admit to feeling very paranoid and targeted. He declined to answer whether or not he had any SI or HI. He denied having any collateral contacts to talk to. He refused to engage with the rest of the interview and did not answer COVID screening questions.    MSE: average build, fair hygiene and grooming, uncooperative behavior, psychomotor agitated, poor eye contact and poor relatedness, impaired impulsve control, loud speech, anxious and irritable mood, expansive affect, paranoid thought content, denying perceptual disturbances, poor insight and poor judgment.    Assessment/Plan:  Patient is a 33 y/o AA M, per 2020 notes single, noncaregiver, undomiciled, unemployed, at that time 1 yr old child in custody of his ex-fiance, with PPhx unspecified anxiety disorder, polysubstance use (LSD, MDMA, psilocybin, MJ, ETOH, cocaine, dextromethorphan), last known admission at [facility] in 020, in 2020 reported prior suicide attempts (cut wrists a few years ago, attempted hanging at age 7), +hx of aggression including on unit during last admission (threw chairs), reported legal problems in [place]? in 2020, but denies arrests in the [place], and denies significant PMhx who presents with paranoid ideation and bizarre behavior in the context of substance abuse. Though patient admitted to using cocaine, mushrooms, and marijuana and was shown to have positve utox, he appears to be still with paranoid ideation and irritability concerning for primary psychotic clinical picture. He has been on Risperdal in the past. He refused to answer whether he had any SI or HI and has known history of prior SA. Given decompensated state, patient demonstrates inability to care for self and presents as potential danger to self or others and in need of psychiatric hospitalization.    Plan:  -- involuntary admission (9.27) to [facility]

## 2022-05-06 NOTE — ED PROVIDER NOTE - PROGRESS NOTE DETAILS
Notified by psych team that the patient was not able to be evaluated as he was not able to use the telepsych system.  They recommend a dose of zyprexa to help calm the patient and then we will try again.  Attempted to obtain contact information for family so that collateral information could be obtained but the patient states there is no one else that we can contact.

## 2022-05-06 NOTE — ED PROVIDER NOTE - CLINICAL SUMMARY MEDICAL DECISION MAKING FREE TEXT BOX
pt w/ AH, paranoia thoughts, noncompliant w/ Risperdal (does not know dose), previous ED record reviewed, will consult w/ psych team

## 2022-05-06 NOTE — ED BEHAVIORAL HEALTH ASSESSMENT NOTE - SUMMARY
Patient is a 33 y/o AA M, per 2020 notes single, noncaregiver, undomiciled, unemployed, at that time 1 yr old child in custody of his ex-fiance, with PPhx unspecified anxiety disorder, polysubstance use (LSD, MDMA, psilocybin, MJ, ETOH, cocaine, dextromethorphan), last known admission at [facility] in 2020, in 2020 reported prior suicide attempts (cut wrists a few years ago, attempted hanging at age 7), +hx of aggression including on unit during last admission (threw chairs), reported legal problems in [place]? in 2020, but denies arrests in the [place], and denies significant PMhx consult called to evaluate AH. Pt had limited ability to tolerate telepsych exam, had low frustration tolerance for adjusting environment for safety and technical aspects of telepsych exam. Unable to obtain current history, no collateral contacts in previous telepsych note or inpt assessment. D/w attending plan to offer zyprexa 5mg and plan for reassessment.

## 2022-05-06 NOTE — ED PROVIDER NOTE - OBJECTIVE STATEMENT
32 yom pw "I'm seeking help for my mental health".  hx of schizophrenia/anxiety.  stopped his Risperdal medication for 2-3 months.  when asked why, pt does not answer.  reports hearing voices, does not know who or what the voices are telling him.  pt reports concern that someone is trying to hurt him but unable to describe details.  when asked if he has SI, pt states "why are you asking me these questions".  prior ED psych visit in 2020, undomiciled, unemployed, noncaregiver.

## 2022-05-06 NOTE — ED ADULT NURSE NOTE - OBJECTIVE STATEMENT
Pt presents to ed reporting suicidal thoughts. reports hist of schizophrenia, seen in this ED before for same issue with an inpatient admission for a few weeks and discharged on medication. says he hasn't been taking his medication for three months.  Belongings taken by security, initiated on 1:1 for SI

## 2022-05-06 NOTE — ED ADULT TRIAGE NOTE - CHIEF COMPLAINT QUOTE
Pt c/o suicidal thoughts and paranoia, endorses PMH of schizophrenia. Pt states he has been noncompliant w/ psychiatric medication for 2-3 months. Denies HI, auditory or visual hallucinations.

## 2022-05-06 NOTE — ED BEHAVIORAL HEALTH NOTE - BEHAVIORAL HEALTH NOTE
===================  PRE-HOSPITAL COURSE  ===================  SOURCE:  ED triage note  DETAILS:  Self presented saying     ============  ED COURSE   ============  SOURCE:  ED chart   ARRIVAL:    BELONGINGS:  All belongings were searched and secured  BEHAVIOR: Per ED RN who received patient this morning, patient has been calm and resting. RN was told from handoff that earlier in the evening patient would become agitated easily. Would become agitated when asked questions by ED staff and would refuse to answer questions. Patient was able to maintain behavioral control and did not require any medications.   TREATMENT:  Patient has not received any medications  VISITORS:  No family/friends at bedside. No collaterals available.    COVID Exposure Screen- collateral (i.e. third-party, chart review, belongings, etc; include EMS and ED staff)     1. *Has the patient been tested for COVID-19 in the last 90 days? ( ) Yes ( ) No ( x) Unknown- Reason: _____     IF YES PROCEED TO QUESTION #2. IF NO OR UNKNOWN, PLEASE SKIP TO QUESTION #3.     2. Date of test(s), type of test(s), result(s) for ALL tests in last 90 days: ________     3. *Has the patient received a COVID-19 vaccine? (x ) Yes ( ) No ( ) Unknown- Reason: _____     IF YES PROCEED TO QUESTION #4. IF NO or UNKNOWN, PLEASE SKIP TO QUESTION #7.     4. Moderna ( ) Pfizer ( x) J&J ( )     5. Number of doses: _2_______     6. Date of last dose: _not reported_______     7. *In the past 10 days, has the patient been around anyone with a positive COVID-19 test?* ( ) Yes ( ) No ( x) Unknown- Reason: ____     IF YES PLEASE ANSWER THE FOLLOWING QUESTIONS:     8. Was the patient within 6 feet of them for at least 15 minutes? ( ) Yes ( ) No ( ) Unknown- Reason: _____     9. Did the patient provide care for them? ( ) Yes ( ) No ( ) Unknown- Reason: ______     10. Did the patient have direct physical contact with them (touched, hugged, or kissed them)? ( ) Yes ( ) No ( ) Unknown- Reason: __     11. Did the patient share eating or drinking utensils with them? ( ) Yes ( ) No ( ) Unknown- Reason: ____     12. Did they sneeze, cough, or somehow get respiratory droplets on the patient? ( ) Yes ( ) No ( ) Unknown- Reason: ______ ===================  PRE-HOSPITAL COURSE  ===================  SOURCE:  ED triage note  DETAILS:  Self presented saying he was paranoid and suicidal and not taking psych medications for 3mths    ============  ED COURSE   ============  SOURCE:  ED chart   ARRIVAL:  Self presented  BELONGINGS:  All belongings were searched and secured  BEHAVIOR: Per ED RN who received patient this morning, patient has been calm and resting. RN was told from handoff that earlier in the evening patient would become agitated easily. Would become agitated when asked questions by ED staff and would refuse to answer questions. Patient was able to maintain behavioral control and did not require any medications.   TREATMENT:  Patient has not received any medications  VISITORS:  No family/friends at bedside. No collaterals available.    COVID Exposure Screen- collateral (i.e. third-party, chart review, belongings, etc; include EMS and ED staff)     1. *Has the patient been tested for COVID-19 in the last 90 days? ( ) Yes ( ) No ( x) Unknown- Reason: _____     IF YES PROCEED TO QUESTION #2. IF NO OR UNKNOWN, PLEASE SKIP TO QUESTION #3.     2. Date of test(s), type of test(s), result(s) for ALL tests in last 90 days: ________     3. *Has the patient received a COVID-19 vaccine? (x ) Yes ( ) No ( ) Unknown- Reason: _____     IF YES PROCEED TO QUESTION #4. IF NO or UNKNOWN, PLEASE SKIP TO QUESTION #7.     4. Moderna ( ) Pfizer ( x) J&J ( )     5. Number of doses: _2_______     6. Date of last dose: _not reported_______     7. *In the past 10 days, has the patient been around anyone with a positive COVID-19 test?* ( ) Yes ( ) No ( x) Unknown- Reason: ____     IF YES PLEASE ANSWER THE FOLLOWING QUESTIONS:     8. Was the patient within 6 feet of them for at least 15 minutes? ( ) Yes ( ) No ( ) Unknown- Reason: _____     9. Did the patient provide care for them? ( ) Yes ( ) No ( ) Unknown- Reason: ______     10. Did the patient have direct physical contact with them (touched, hugged, or kissed them)? ( ) Yes ( ) No ( ) Unknown- Reason: __     11. Did the patient share eating or drinking utensils with them? ( ) Yes ( ) No ( ) Unknown- Reason: ____     12. Did they sneeze, cough, or somehow get respiratory droplets on the patient? ( ) Yes ( ) No ( ) Unknown- Reason: ______

## 2022-05-06 NOTE — ED BEHAVIORAL HEALTH ASSESSMENT NOTE - HPI (INCLUDE ILLNESS QUALITY, SEVERITY, DURATION, TIMING, CONTEXT, MODIFYING FACTORS, ASSOCIATED SIGNS AND SYMPTOMS)
Patient is a 31 y/o AA M, per 2020 notes single, noncaregiver, undomiciled, unemployed, at that time 1 yr old child in custody of his ex-fiance, with PPhx unspecified anxiety disorder, polysubstance use (LSD, MDMA, psilocybin, MJ, ETOH, cocaine, dextromethorphan), last known admission at [facility] in 2020, in 2020 reported prior suicide attempts (cut wrists a few years ago, attempted hanging at age 7), +hx of aggression including on unit during last admission (threw chairs), reported legal problems in [place]? in 2020, but denies arrests in the [place], and denies significant PMhx consult called to evaluate AH.     Pt presented irritable and annoyed by logistics of telepsych process, reported "I'm feeling extremely frustrated right now, I'm dealing with a lot of challenging thoughts and emotions, I'm hoping I wont have to repeat myself." He had to be redirected to stand where he could be seen on device, speak louder and by 1:1 appeared to be redirected to not stand touching/very close to device, likely to reduce risk of damage to device should pt become agitated. He started shouting, was upset, and it did not seem advisable to continue the assessment.

## 2022-05-06 NOTE — ED PROVIDER NOTE - PHYSICAL EXAMINATION
Physical Exam  GEN: Awake, alert, non-toxic appearing,  EYES: full EOMI,  ENT: External inspection normal, normal voice,   HEAD: atraumatic  NECK: FROM neck, supple,   RESP: no tachypnea, no hypoxia, no resp distress,  MSK: zhou x 4  NEURO: ao x 3, speaking clearly, steady gait, strength equal blappears paranoid during interview

## 2022-05-07 RX ORDER — METHOCARBAMOL 500 MG/1
1500 TABLET, FILM COATED ORAL ONCE
Refills: 0 | Status: COMPLETED | OUTPATIENT
Start: 2022-05-07 | End: 2022-05-07

## 2022-05-07 RX ADMIN — Medication 2 MILLIGRAM(S): at 03:02

## 2022-05-07 RX ADMIN — HALOPERIDOL DECANOATE 5 MILLIGRAM(S): 100 INJECTION INTRAMUSCULAR at 03:02

## 2022-05-07 RX ADMIN — METHOCARBAMOL 1500 MILLIGRAM(S): 500 TABLET, FILM COATED ORAL at 03:01

## 2022-05-07 RX ADMIN — OLANZAPINE 5 MILLIGRAM(S): 15 TABLET, FILM COATED ORAL at 03:04

## 2022-05-07 RX ADMIN — Medication 50 MILLIGRAM(S): at 03:02
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
## 2022-05-07 NOTE — BH INPATIENT PSYCHIATRY ASSESSMENT NOTE - NSCOMMENTSUICRISKFACT_PSY_ALL_CORE
MARCUS chronically elevated given diagnoses of Schizoaffective Disorder, Borderline Personality Disorder, PTSD, history of ongoing substance use, and history of trauma which is acutely raised given reported SI, agitation, and irritability in the setting of substance use and treatment non-compliance.

## 2022-05-07 NOTE — BH INPATIENT PSYCHIATRY ASSESSMENT NOTE - NSCOMMENTSUICPROTRISKFACT_PSY_ALL_CORE
Protective factors that mitigate this risk include a capacity to advocate for self, concern for well-being, domiciled, and future-motivated.

## 2022-05-07 NOTE — BH INPATIENT PSYCHIATRY ASSESSMENT NOTE - NSICDXBHSECONDARYDX_PSY_ALL_CORE
Lab: [id number]  [facility] Repair Performed By Another Provider Text (Leave Blank If You Do Not Want): After the tissue was excised the defect was repaired by another provider. Body Location Override (Optional - Billing Will Still Be Based On Selected Body Map Location If Applicable): left medial lower back Complex Repair And O-T Advancement Flap Text: The defect edges were debeveled with a #15 scalpel blade. The primary defect was closed partially with a complex linear closure. Given the location of the remaining defect, shape of the defect and the proximity to free margins an O-T advancement flap was deemed most appropriate for complete closure of the defect. Using a sterile surgical marker, an appropriate advancement flap was drawn incorporating the defect and placing the expected incisions within the relaxed skin tension lines where possible. The area thus outlined was incised deep to adipose tissue with a #15 scalpel blade. The skin margins were undermined to an appropriate distance in all directions utilizing iris scissors. Dressing: dry sterile dressing Melolabial Transposition Flap Text: The defect edges were debeveled with a #15 scalpel blade. Given the location of the defect and the proximity to free margins a melolabial flap was deemed most appropriate. Using a sterile surgical marker, an appropriate melolabial transposition flap was drawn incorporating the defect. The area thus outlined was incised deep to adipose tissue with a #15 scalpel blade. The skin margins were undermined to an appropriate distance in all directions utilizing iris scissors. V-Y Flap Text: The defect edges were debeveled with a #15 scalpel blade. Given the location of the defect, shape of the defect and the proximity to free margins a V-Y flap was deemed most appropriate. Using a sterile surgical marker, an appropriate advancement flap was drawn incorporating the defect and placing the expected incisions within the relaxed skin tension lines where possible. The area thus outlined was incised deep to adipose tissue with a #15 scalpel blade. The skin margins were undermined to an appropriate distance in all directions utilizing iris scissors. Rhombic Flap Text: The defect edges were debeveled with a #15 scalpel blade. Given the location of the defect and the proximity to free margins a rhombic flap was deemed most appropriate. Using a sterile surgical marker, an appropriate rhombic flap was drawn incorporating the defect. The area thus outlined was incised deep to adipose tissue with a #15 scalpel blade. The skin margins were undermined to an appropriate distance in all directions utilizing iris scissors. Scalpel Size: 15 blade Complex Repair And M Plasty Text: The defect edges were debeveled with a #15 scalpel blade. The primary defect was closed partially with a complex linear closure. Given the location of the remaining defect, shape of the defect and the proximity to free margins an M plasty was deemed most appropriate for complete closure of the defect. Using a sterile surgical marker, an appropriate advancement flap was drawn incorporating the defect and placing the expected incisions within the relaxed skin tension lines where possible. The area thus outlined was incised deep to adipose tissue with a #15 scalpel blade. The skin margins were undermined to an appropriate distance in all directions utilizing iris scissors. O-L Flap Text: The defect edges were debeveled with a #15 scalpel blade. Given the location of the defect, shape of the defect and the proximity to free margins an O-L flap was deemed most appropriate. Using a sterile surgical marker, an appropriate advancement flap was drawn incorporating the defect and placing the expected incisions within the relaxed skin tension lines where possible. The area thus outlined was incised deep to adipose tissue with a #15 scalpel blade. The skin margins were undermined to an appropriate distance in all directions utilizing iris scissors. Complex Repair And Dermal Autograft Text: The defect edges were debeveled with a #15 scalpel blade. The primary defect was closed partially with a complex linear closure. Given the location of the defect, shape of the defect and the proximity to free margins an dermal autograft was deemed most appropriate to repair the remaining defect. The graft was trimmed to fit the size of the remaining defect. The graft was then placed in the primary defect, oriented appropriately, and sutured into place. O-T Advancement Flap Text: The defect edges were debeveled with a #15 scalpel blade. Given the location of the defect, shape of the defect and the proximity to free margins an O-T advancement flap was deemed most appropriate. Using a sterile surgical marker, an appropriate advancement flap was drawn incorporating the defect and placing the expected incisions within the relaxed skin tension lines where possible. The area thus outlined was incised deep to adipose tissue with a #15 scalpel blade. The skin margins were undermined to an appropriate distance in all directions utilizing iris scissors. Complex Repair And A-T Advancement Flap Text: The defect edges were debeveled with a #15 scalpel blade. The primary defect was closed partially with a complex linear closure. Given the location of the remaining defect, shape of the defect and the proximity to free margins an A-T advancement flap was deemed most appropriate for complete closure of the defect. Using a sterile surgical marker, an appropriate advancement flap was drawn incorporating the defect and placing the expected incisions within the relaxed skin tension lines where possible. The area thus outlined was incised deep to adipose tissue with a #15 scalpel blade. The skin margins were undermined to an appropriate distance in all directions utilizing iris scissors. Complex Repair And Ftsg Text: The defect edges were debeveled with a #15 scalpel blade. The primary defect was closed partially with a complex linear closure. Given the location of the defect, shape of the defect and the proximity to free margins a full thickness skin graft was deemed most appropriate to repair the remaining defect. The graft was trimmed to fit the size of the remaining defect. The graft was then placed in the primary defect, oriented appropriately, and sutured into place. Dorsal Nasal Flap Text: The defect edges were debeveled with a #15 scalpel blade. Given the location of the defect and the proximity to free margins a dorsal nasal flap was deemed most appropriate. Using a sterile surgical marker, an appropriate dorsal nasal flap was drawn around the defect. The area thus outlined was incised deep to adipose tissue with a #15 scalpel blade. The skin margins were undermined to an appropriate distance in all directions utilizing iris scissors. Mastoid Interpolation Flap Text: A decision was made to reconstruct the defect utilizing an interpolation axial flap and a staged reconstruction. A telfa template was made of the defect. This telfa template was then used to outline the mastoid interpolation flap. The donor area for the pedicle flap was then injected with anesthesia. The flap was excised through the skin and subcutaneous tissue down to the layer of the underlying musculature. The pedicle flap was carefully excised within this deep plane to maintain its blood supply. The edges of the donor site were undermined. The donor site was closed in a primary fashion. The pedicle was then rotated into position and sutured. Once the tube was sutured into place, adequate blood supply was confirmed with blanching and refill. The pedicle was then wrapped with xeroform gauze and dressed appropriately with a telfa and gauze bandage to ensure continued blood supply and protect the attached pedicle. Severe cannabis use disorder   F12.20  Borderline personality disorder in adult   F60.3  Antisocial personality disorder in adult   F60.2  Moderate alcohol use disorder   F10.20  Moderate cocaine use disorder   F14.20   

## 2022-05-07 NOTE — BH INPATIENT PSYCHIATRY ASSESSMENT NOTE - HPI (INCLUDE ILLNESS QUALITY, SEVERITY, DURATION, TIMING, CONTEXT, MODIFYING FACTORS, ASSOCIATED SIGNS AND SYMPTOMS)
Patient is a 31 y/o AA M, per 2020 notes single, noncaregiver, undomiciled, unemployed, at that time 1 yr old child in custody of his ex-fiance, with PPhx unspecified anxiety disorder, polysubstance use (LSD, MDMA, psilocybin, MJ, ETOH, cocaine, dextromethorphan), last known admission at [facility] in 2020, in 2020 reported prior suicide attempts (cut wrists a few years ago, attempted hanging at age 7), +hx of aggression including on unit during last admission (threw chairs), reported legal problems in [place]? in 2020, but denies arrests in the [place], and denies significant PMhx consult called to evaluate AH.     Pt presented irritable and annoyed by logistics of telepsych process, reported "I'm feeling extremely frustrated right now, I'm dealing with a lot of challenging thoughts and emotions, I'm hoping I wont have to repeat myself." He had to be redirected to stand where he could be seen on device, speak louder and by 1:1 appeared to be redirected to not stand touching/very close to device, likely to reduce risk of damage to device should pt become agitated. He started shouting, was upset, and it did not seem advisable to continue the assessment. 32 year old  male single, has one child in the custody of his ex-fiance, domiciled per patient in [place] apartment, unemployed with PMH Polysubstance Use Disorder (LSD, MDMA, Psilocybin, Cannabis, Alcohol, Cocaine, Dextromethorphan) and PPH Borderline Personality Disorder, Schizoaffective Disorder, Unspecified Anxiety Disorder, PTSD, multiple inpatient psychiatric hospitalizations, most recently at [facility] (08/09/21 – 08/10/21) for Schizoaffective Disorder Unspecified, not currently engaged in outpatient psychiatric treatment, per chart review a history of suicide attempts (cut wrists a few years ago, attempted hanging at age 7), no history of NSSIB, history of aggression including on unit during last admission (threw chairs), history of trauma presenting to [facility] complaining of AH and presenting with irritability, paranoid ideation, and bizarre behavior, in the setting of substance use (cocaine, psilocybin, cannabis) and medication non-compliance, requiring olanzapine 5 mg for agitation in the ED, admitted involuntarily to [address] given an inability to care for self and an acute danger to self and others.      On approach, the patient is sleeping quietly in his room and awakens to verbal stimuli.  On evaluation, the patient is irritable, uncooperative, becomes agitated and makes vague threats to interviewer, demonstrating poor behavioral control and grossly linear thought processes.  The patient states that he is "not well" and that he has been "feeling low every day."  The patient states that he is "depressed, anxious, weak, inferior and nervous."  The patient states that he experiences intermittent insomnia and anhedonia but reports good energy and appetite and denies feelings of guilt.  The patient denies SI, intent and plan but states that he "has a feeling [he] just can't shake."  The patient denies AVH and no paranoia or delusions are reported or elicited on interview.  The patient states that he "has no psychiatric diagnoses" and that "its your thing and it should be in the chart."  The patient states he "may have been hospitalized a couple of times, maybe in [facility]."  The patient denies any current outpatient psychiatric treatment and he states that he has been on risperidone in the past as well as Lithium but he stopped the latter due to side effects which he refuses to elaborate on.  The patient states "I may have had a few suicide attempts but I don't remember."  The patient states that he uses cannabis daily and he states that he has used "mushrooms in the past."  The patient states that he was raised in the [place] by his mother and father and then states that he "lives in an apartment with, you know, people" and he refuses to elaborate further.  The patient becomes more agitated, and sits up at the edge of the bed where the interviewer is standing and states that he "had a job but luckily [he] doesn't work there anymore because [he] is having some thoughts and where [he] worked had lots of sharp knives that [he] feels [he] wants to hurt someone with."  The patient replies no to every other question asked and he abruptly ends the interview.  All questions and concerns addressed.     Per ED Behavioral Health Assessment Note (05/06/22):   Patient is a 33 y/o AA M, per 2020 notes single, noncaregiver, undomiciled, unemployed, at that time 1 yr old child in custody of his ex-fiance, with PPhx unspecified anxiety disorder, polysubstance use (LSD, MDMA, psilocybin, MJ, ETOH, cocaine, dextromethorphan), last known admission at [facility] in 020, in 2020 reported prior suicide attempts (cut wrists a few years ago, attempted hanging at age 7), +hx of aggression including on unit during last admission (threw chairs), reported legal problems in [place]? in 2020, but denies arrests in the [place], and denies significant PMhx consult called to evaluate AH.     The patient was given 5mg Zyprexa by mouth on 5/6/2022 at 12:01PM.    On re-evaluation, the patient was initially calm, inappropriately smiling, gesturing to people outside the room. During the course of the interview, he became increasingly agitated, requesting to leave, refusing to answer all questions.    The patient states that for the past several days, he has been using cocaine, mushrooms, weed. Yesterday he was feeling "nervous, scared, weak, inferior, inadequate, and targeted." When asked to clarify, he became very vague, saying that "it should be in the chart." He admitted to being hospitalized at [facility] and found it helpful. He used to take Risperdal but stopped "a long time ago" and that he found it helpful when he used to take it. In between answering questions, he would make comments about feeling people outside were all connected and that he didn't trust anyone. He said that his felings were "colliding with thoughts" and "creating a reality different from everyone else's." He denied any AVH but does admit to feeling very paranoid and targeted. He declined to answer whether or not he had any SI or HI. He denied having any collateral contacts to talk to. He refused to engage with the rest of the interview and did not answer COVID screening questions.    MSE: average build, fair hygiene and grooming, uncooperative behavior, psychomotor agitated, poor eye contact and poor relatedness, impaired impulsve control, loud speech, anxious and irritable mood, expansive affect, paranoid thought content, denying perceptual disturbances, poor insight and poor judgment.    Assessment/Plan:  Patient is a 33 y/o AA M, per 2020 notes single, noncaregiver, undomiciled, unemployed, at that time 1 yr old child in custody of his ex-fiance, with PPhx unspecified anxiety disorder, polysubstance use (LSD, MDMA, psilocybin, MJ, ETOH, cocaine, dextromethorphan), last known admission at [facility] in 020, in 2020 reported prior suicide attempts (cut wrists a few years ago, attempted hanging at age 7), +hx of aggression including on unit during last admission (threw chairs), reported legal problems in [place]? in 2020, but denies arrests in the [place], and denies significant PMhx who presents with paranoid ideation and bizarre behavior in the context of substance abuse. Though patient admitted to using cocaine, mushrooms, and marijuana and was shown to have positve utox, he appears to be still with paranoid ideation and irritability concerning for primary psychotic clinical picture. He has been on Risperdal in the past. He refused to answer whether he had any SI or HI and has known history of prior SA. Given decompensated state, patient demonstrates inability to care for self and presents as potential danger to self or others and in need of psychiatric hospitalization.

## 2022-05-07 NOTE — BH INPATIENT PSYCHIATRY ASSESSMENT NOTE - VIOLENCE RISK FACTORS:
Substance abuse/History of being victimized/traumatized/Noncompliance with treatment Antisocial behavior/cognition (past or present)/Substance abuse/Affective dysregulation/Impulsivity/History of being victimized/traumatized/Noncompliance with treatment/Irritability

## 2022-05-07 NOTE — BH INPATIENT PSYCHIATRY ASSESSMENT NOTE - NSBHCHARTREVIEWVS_PSY_A_CORE FT
Vital Signs Last 24 Hrs  T(C): 36.6 (05-07-22 @ 05:37), Max: 36.9 (05-06-22 @ 22:30)  T(F): 97.9 (05-07-22 @ 05:37), Max: 98.5 (05-06-22 @ 22:30)  HR: 60 (05-07-22 @ 05:37) (55 - 74)  BP: 107/72 (05-07-22 @ 05:37) (101/63 - 126/66)  BP(mean): --  RR: 18 (05-07-22 @ 05:37) (14 - 18)  SpO2: 99% (05-07-22 @ 05:37) (95% - 100%)     Vital Signs Last 24 Hrs  T(C): 36.7 (05-07-22 @ 20:05), Max: 36.8 (05-07-22 @ 02:19)  T(F): 98 (05-07-22 @ 20:05), Max: 98.2 (05-07-22 @ 02:19)  HR: 70 (05-07-22 @ 20:05) (55 - 70)  BP: 127/76 (05-07-22 @ 20:05) (107/72 - 127/76)  BP(mean): --  RR: 18 (05-07-22 @ 20:05) (16 - 18)  SpO2: 98% (05-07-22 @ 20:05) (98% - 100%)

## 2022-05-07 NOTE — BH PATIENT PROFILE - NSPROEDAREADYLEARN_GEN_A_NUR
<<----- Click to add NO pertinent Past Medical History No pertinent past medical history acuteness of illness

## 2022-05-07 NOTE — BH INPATIENT PSYCHIATRY ASSESSMENT NOTE - OTHER PAST PSYCHIATRIC HISTORY (INCLUDE DETAILS REGARDING ONSET, COURSE OF ILLNESS, INPATIENT/OUTPATIENT TREATMENT)
as in HPI PSYCKES:  MEDICAID ID: [id number]  TX FOR SI: SI x3 (first on 07/20/20) most recently on 12/08/20 at [facility], Treatment for Self-Inflicted Poisoning (first on 07/23/20) and most recently on 07/31/20 at [facility]  QUALITY FLAGS: High Mental Health Need (1+ Inpt MH in past 12 months), [facility] Performance Tracking Measure as of 10/01/2021: Low Antipsychotic Medication Adherence - Schizophrenia  No Diabetes Screening - Schizophrenia/Bipolar on Antipsychotic  BEHAVIORAL HEALTH DIAGNOSES: Schizoaffective Disorder Borderline Personality Disorder PTSD Unspecified/Other Psychotic Disorders Unspecified/Other Anxiety Disorder Unspecified/Other Bipolar Other Mental Disorders Delusional Disorder Major Depressive Disorder Schizophrenia Unspecified/Other Depressive Disorder  BEHAVIORAL HEALTH MEDICATIONS: Divalproex Sodium 250 mg PO BID (01/27/21), Gabapentin 300 mg PO daily (12/31/20 – 01/27/21), Lorazepam 0.5 mg PO daily (01/20/21), Risperidone 3 mg PO BID (12/07/20 – 01/20/21), Hydroxyzine HCl 10 mg PO daily (12/31/20), Lithium Carbonate  mg PO BID (12/07/20 – 12/31/20), Escitalopram Oxalate 10 mg PO daily (06/07/18 – 07/25/18).    OUTPATIENT: Carolyn Segundo NP (11/09/21) for Anxiety Disorder Unspecified, [facility] (12/14/20 – 01/14/21; 11/09/21) for Anxiety Disorder Unspecified, [facility] (12/22/20 – 05/13/21) for MDD single episode moderate, [facility] (07/22/20) for Unspecified Psychosis not due to a substance or known physiological condition, [facility]  (06/07/18 – 08/07/18) for Mental Disorder Not Otherwise Specified.  INPATIENT:  x3 most recently at [facility] (08/09/21 – 08/10/21) for Schizoaffective Disorder Unspecified, [facility] (11/26/20 – 12/08/20) for Schizoaffective Disorder Bipolar Type, [facility] (07/23/20 – 07/31/20) for Unspecified Psychosis not due to a substance or known physiological condition  ED: MH x4 most recently at [facility] (07/22/20 – 07/23/20) for Unspecified Psychosis Not Due To A Substance Or Known Physiological Condition, [facility] (07/20/20) for SI, [facility] (01/21/18)  Crisis Residential Services: [facility] (12/04/21, 12/31/21) for Borderline Personality Disorder, [facility] (02/15/21) for Schizoaffective Disorder Depressive Type    ISTOP:  Reference #: [id number]  No Records Found.

## 2022-05-07 NOTE — BH PATIENT PROFILE - FALL HARM RISK - UNIVERSAL INTERVENTIONS
Bed in lowest position, wheels locked, appropriate side rails in place/Call bell, personal items and telephone in reach/Instruct patient to call for assistance before getting out of bed or chair/Non-slip footwear when patient is out of bed/[place] to call system/Physically safe environment - no spills, clutter or unnecessary equipment/Purposeful Proactive Rounding/Room/bathroom lighting operational, light cord in reach

## 2022-05-07 NOTE — BH INPATIENT PSYCHIATRY ASSESSMENT NOTE - NSBHCHARTREVIEWLAB_PSY_A_CORE FT
CBC (05/06/22): wnl  CMP (05/06/22): Carbon Dioxide Serum 32 (elevated), Anion Gap Serum 7 (low)   Utox (05/06/22): Positive for THC and Cocaine

## 2022-05-07 NOTE — BH INPATIENT PSYCHIATRY ASSESSMENT NOTE - NSBHMETABOLIC_PSY_ALL_CORE_FT
BMI:   HbA1c:   Glucose:   BP: 107/72 (05-07-22 @ 05:37) (101/63 - 136/72)  Lipid Panel:  BMI:   HbA1c:   Glucose:   BP: 127/76 (05-07-22 @ 20:05) (101/63 - 136/72)  Lipid Panel:

## 2022-05-07 NOTE — ED ADULT NURSE REASSESSMENT NOTE - NS ED NURSE REASSESS COMMENT FT1
Awaiting reassessment, disposition pending. Call light within reach>pt apprised of plan of care. 1:1 maintained.
Pt rcvd from TAYLOR Mckinley.  Pt pending psych reeval for disposition.  One-to-one maintained.
Pt received from Yuliet VAZQUEZ. 1:1 constant observation maintained. Pt resting comfortably in bed. No s/s of acute distress. Will continue to monitor
pt interviewed by Telepsych & PCT present during interview.  Pt appears to be using pressured speech while being assessed.  Plan of care pending.
spoke with telepsych- awaiting re-evaluation
pt a&ox3 received from TAYLOR Hui. currently speaking to telepsych. will continue to monitor.

## 2022-05-07 NOTE — BH PATIENT PROFILE - STATED REASON FOR ADMISSION
I reviewed the H&P, I examined the patient, and there are no changes in the patient's condition. Pt with HMB, Fibroids here for definitive surgery  Pt was previously undecided regarding keeping vs removing ovaries. Pt desires removal of ovaries. Understands this will result in surgical menopause.     Consent on chart  Prep for OR
Suicide Ideation

## 2022-05-07 NOTE — BH INPATIENT PSYCHIATRY ASSESSMENT NOTE - PAST PSYCHOTROPIC MEDICATION
Per PSYCKES: Divalproex Sodium 250 mg PO BID (01/27/21), Gabapentin 300 mg PO daily (12/31/20 – 01/27/21), Lorazepam 0.5 mg PO daily (01/20/21), Risperidone 3 mg PO BID (12/07/20 – 01/20/21), Hydroxyzine HCl 10 mg PO daily (12/31/20), Lithium Carbonate  mg PO BID (12/07/20 – 12/31/20), Escitalopram Oxalate 10 mg PO daily (06/07/18 – 07/25/18).

## 2022-05-07 NOTE — BH INPATIENT PSYCHIATRY ASSESSMENT NOTE - CURRENT MEDICATION
MEDICATIONS  (STANDING):    MEDICATIONS  (PRN):  diphenhydrAMINE Injectable 50 milliGRAM(s) IntraMuscular every 6 hours PRN Agitation  haloperidol    Injectable 5 milliGRAM(s) IntraMuscular every 6 hours PRN Agitation  LORazepam   Injectable 2 milliGRAM(s) IntraMuscular every 4 hours PRN Agitation   MEDICATIONS  (STANDING):    MEDICATIONS  (PRN):  diphenhydrAMINE 50 milliGRAM(s) Oral every 6 hours PRN agitation  diphenhydrAMINE Injectable 50 milliGRAM(s) IntraMuscular every 6 hours PRN Agitation  haloperidol     Tablet 5 milliGRAM(s) Oral every 6 hours PRN agitation  haloperidol    Injectable 5 milliGRAM(s) IntraMuscular every 6 hours PRN Agitation  LORazepam     Tablet 2 milliGRAM(s) Oral every 6 hours PRN agitation  LORazepam   Injectable 2 milliGRAM(s) IntraMuscular every 4 hours PRN Agitation

## 2022-05-07 NOTE — BH INPATIENT PSYCHIATRY ASSESSMENT NOTE - NSCURPASTPSYDX_PSY_ALL_CORE
Psychotic disorder/Alcohol/Substance Use disorders Mood disorder/Psychotic disorder/Alcohol/Substance Use disorders/Cluster B Personality disorder/traits

## 2022-05-07 NOTE — BH INPATIENT PSYCHIATRY ASSESSMENT NOTE - DESCRIPTION
see HPI The patient was born and raised in the [place] by his mother and father.  The patient is reportedly domiciled.  The patient is currently unemployed.  The patient is otherwise non-contributory to the interview.

## 2022-05-07 NOTE — BH INPATIENT PSYCHIATRY ASSESSMENT NOTE - NSBHASSESSSUMMFT_PSY_ALL_CORE
32 year old  male single, has one child in the custody of his ex-fiance, domiciled per patient in [place] apartment, unemployed with PMH Polysubstance Use Disorder (LSD, MDMA, Psilocybin, Cannabis, Alcohol, Cocaine, Dextromethorphan) and PPH Borderline Personality Disorder, Schizoaffective Disorder, Unspecified Anxiety Disorder, PTSD, multiple inpatient psychiatric hospitalizations, most recently at [facility] (08/09/21 – 08/10/21) for Schizoaffective Disorder Unspecified, not currently engaged in outpatient psychiatric treatment, per chart review a history of suicide attempts (cut wrists a few years ago, attempted hanging at age 7), no history of NSSIB, history of aggression including on unit during last admission (threw chairs), history of trauma presenting to [facility] complaining of AH and presenting with irritability, paranoid ideation, and bizarre behavior, in the setting of substance use (cocaine, psilocybin, cannabis) and medication non-compliance, requiring olanzapine 5 mg for agitation in the ED, admitted involuntarily to [address] given an inability to care for self and an acute danger to self and others.      On approach, the patient is sleeping quietly in his room and awakens to verbal stimuli.  On evaluation, the patient is irritable, uncooperative, becomes agitated and makes vague threats to interviewer, demonstrating poor behavioral control and grossly linear thought processes.  The patient is mostly non-contributory to the interview.  The patient's presentation of worsening mood, paranoia, irritability and bizarre behavior is consistent with a Mood Disorder (Schizoaffective Disorder vs SIMD).  MARCUS chronically elevated given diagnoses of Schizoaffective Disorder, Borderline Personality Disorder, PTSD, history of ongoing substance use, and history of trauma which is acutely raised given reported SI, agitation, and irritability in the setting of substance use and treatment non-compliance. Protective factors that mitigate this risk include a capacity to advocate for self, concern for well-being, domiciled, and future-motivated.  VRA chronically elevated given history of aggression, Antisocial Personality Traits and ongoing substance use which is acutely elevated given acute psychiatric decompensation, treatment non-compliance, reports of vague HI, and ongoing substance use.  Given that the patient is unable to care for himself and is an acute risk to himself and others, an inpatient psychiatric hospitalization is warranted for safety, medication optimization and psychiatric stabilization.    Plan:  # Mood Disorder Unspecified (Schizoaffective Disorder vs SIMD):  # Cluster B Personality Traits (Borderline, Antisocial):  - start risperidone 1 mg PO BID  - hydroxyzine 25 mg PO TID PRN for anxiety   - can consider initiation of a mood stabilizer such as divalproex sodium  - Haldol 5 mg PO/IM, Lorazepam 2 mg PO/IM, Diphenhydramine 50 mg PO/IM for acute agitation    # Alcohol Use Disorder:  # Cocaine Use Disorder:  # Cannabis Use Disorder  - motivational interviewing   - referral to outpatient [facility] rehabilitation upon discharge

## 2022-05-07 NOTE — BH INPATIENT PSYCHIATRY ASSESSMENT NOTE - NSDCCRITERIA_PSY_ALL_CORE
Decrease in intensity and frequency of SI, resolution of HI, improved mood stability, decrease of paranoid ideation, improved thought processes, establishment of outpatient psychiatric follow-up

## 2022-05-07 NOTE — BH INPATIENT PSYCHIATRY ASSESSMENT NOTE - RISK ASSESSMENT
unable to assess MARCUS chronically elevated given diagnoses of Schizoaffective Disorder, Borderline Personality Disorder, PTSD, history of ongoing substance use, and history of trauma which is acutely raised given reported SI, agitation, and irritability in the setting of substance use and treatment non-compliance. Protective factors that mitigate this risk include a capacity to advocate for self, concern for well-being, domiciled, and future-motivated.  VRA chronically elevated given history of aggression, Antisocial Personality Traits and ongoing substance use which is acutely elevated given acute psychiatric decompensation, treatment non-compliance, reports of vague HI, and ongoing substance use.

## 2022-05-08 DIAGNOSIS — F60.2 ANTISOCIAL PERSONALITY DISORDER: ICD-10-CM

## 2022-05-08 DIAGNOSIS — F12.20 CANNABIS DEPENDENCE, UNCOMPLICATED: ICD-10-CM

## 2022-05-08 DIAGNOSIS — F14.20 COCAINE DEPENDENCE, UNCOMPLICATED: ICD-10-CM

## 2022-05-08 DIAGNOSIS — F60.3 BORDERLINE PERSONALITY DISORDER: ICD-10-CM

## 2022-05-08 DIAGNOSIS — F10.20 ALCOHOL DEPENDENCE, UNCOMPLICATED: ICD-10-CM

## 2022-05-08 RX ORDER — RISPERIDONE 4 MG/1
1 TABLET ORAL
Refills: 0 | Status: DISCONTINUED | OUTPATIENT
Start: 2022-05-08 | End: 2022-05-09

## 2022-05-08 RX ORDER — DIPHENHYDRAMINE HCL 50 MG
50 CAPSULE ORAL EVERY 6 HOURS
Refills: 0 | Status: DISCONTINUED | OUTPATIENT
Start: 2022-05-08 | End: 2022-05-09

## 2022-05-08 RX ORDER — HYDROXYZINE HCL 10 MG
25 TABLET ORAL EVERY 6 HOURS
Refills: 0 | Status: DISCONTINUED | OUTPATIENT
Start: 2022-05-08 | End: 2022-05-09

## 2022-05-08 RX ORDER — HALOPERIDOL DECANOATE 100 MG/ML
5 INJECTION INTRAMUSCULAR EVERY 6 HOURS
Refills: 0 | Status: DISCONTINUED | OUTPATIENT
Start: 2022-05-08 | End: 2022-05-09

## 2022-05-08 RX ADMIN — RISPERIDONE 1 MILLIGRAM(S): 4 TABLET ORAL at 21:37

## 2022-05-08 RX ADMIN — Medication 2 MILLIGRAM(S): at 22:07

## 2022-05-08 RX ADMIN — HALOPERIDOL DECANOATE 5 MILLIGRAM(S): 100 INJECTION INTRAMUSCULAR at 01:50

## 2022-05-08 RX ADMIN — Medication 50 MILLIGRAM(S): at 01:50

## 2022-05-08 RX ADMIN — Medication 2 MILLIGRAM(S): at 01:53

## 2022-05-08 NOTE — BH INPATIENT PSYCHIATRY PROGRESS NOTE - PRN MEDS
MEDICATIONS  (PRN):  diphenhydrAMINE 50 milliGRAM(s) Oral every 6 hours PRN agitation  diphenhydrAMINE Injectable 50 milliGRAM(s) IntraMuscular every 6 hours PRN Agitation  haloperidol     Tablet 5 milliGRAM(s) Oral every 6 hours PRN agitation  haloperidol    Injectable 5 milliGRAM(s) IntraMuscular every 6 hours PRN Agitation  hydrOXYzine hydrochloride 25 milliGRAM(s) Oral every 6 hours PRN anxiety  LORazepam     Tablet 2 milliGRAM(s) Oral every 6 hours PRN agitation  LORazepam   Injectable 2 milliGRAM(s) IntraMuscular every 4 hours PRN Agitation

## 2022-05-08 NOTE — BH CHART NOTE - NSEVENTNOTEFT_PSY_ALL_CORE
Cyril Jeter  : 88  MRN: [id number]    HPI:   32 year old  male single, has one child in the custody of his ex-fiance, domiciled per patient in [place] apartment, unemployed with PMH Polysubstance Use Disorder (LSD, MDMA, Psilocybin, Cannabis, Alcohol, Cocaine, Dextromethorphan) and PPH Borderline Personality Disorder, Schizoaffective Disorder, Unspecified Anxiety Disorder, PTSD, multiple inpatient psychiatric hospitalizations, most recently at [facility] (21 – 08/10/21) for Schizoaffective Disorder Unspecified, not currently engaged in outpatient psychiatric treatment, per chart review a history of suicide attempts (cut wrists a few years ago, attempted hanging at age 7), no history of NSSIB, history of aggression including on unit during last admission (threw chairs), history of trauma presenting to [facility] complaining of AH and presenting with irritability, paranoid ideation, and bizarre behavior, in the setting of substance use (cocaine, psilocybin, cannabis) and medication non-compliance, requiring olanzapine 5 mg for agitation in the ED, admitted involuntarily to [address] given an inability to care for self and an acute danger to self and others.      Vitals: None.  Patient refused.    Physical Exam:  Appearance & Skin: middle-aged male, agitated, skin dry, no rashes  Head & Neck; ENT: head normocephalic/atraumatic, EOMI, sclera anicteric, no conjunctival injection bilaterally, mucous membranes moist, nares patent  Chest: no increased work of breathing  Cardiac: regular rate and rhythm  Abdomen: non-distended  Neurological: AOx3, moving all four extremities against gravity  Extremities: no peripheral cyanosis or edema bilaterally    Kody Avery MD  Department of Psychiatry

## 2022-05-08 NOTE — BH INPATIENT PSYCHIATRY PROGRESS NOTE - NSBHMETABOLIC_PSY_ALL_CORE_FT
BMI:   HbA1c:   Glucose:   BP: 93/57 (05-08-22 @ 06:19) (93/57 - 136/72)  Lipid Panel:  BMI:   HbA1c:   Glucose:   BP: 126/85 (05-08-22 @ 08:17) (93/57 - 136/72)  Lipid Panel:

## 2022-05-08 NOTE — BH INPATIENT PSYCHIATRY PROGRESS NOTE - NSBHASSESSSUMMFT_PSY_ALL_CORE
32 year old  male single, has one child in the custody of his ex-fiance, domiciled per patient in [place] apartment, unemployed with PMH Polysubstance Use Disorder (LSD, MDMA, Psilocybin, Cannabis, Alcohol, Cocaine, Dextromethorphan) and PPH Borderline Personality Disorder, Schizoaffective Disorder, Unspecified Anxiety Disorder, PTSD, multiple inpatient psychiatric hospitalizations, most recently at [facility] (08/09/21 – 08/10/21) for Schizoaffective Disorder Unspecified, not currently engaged in outpatient psychiatric treatment, per chart review a history of suicide attempts (cut wrists a few years ago, attempted hanging at age 7), no history of NSSIB, history of aggression including on unit during last admission (threw chairs), history of trauma presenting to [facility] complaining of AH and presenting with irritability, paranoid ideation, and bizarre behavior, in the setting of substance use (cocaine, psilocybin, cannabis) and medication non-compliance, requiring olanzapine 5 mg for agitation in the ED, admitted involuntarily to [address] given an inability to care for self and an acute danger to self and others.      On approach, the patient is sleeping quietly in his room and awakens to verbal stimuli.  On evaluation, the patient is irritable, uncooperative, becomes agitated and makes vague threats to interviewer, demonstrating poor behavioral control and grossly linear thought processes.  The patient is mostly non-contributory to the interview.  The patient's presentation of worsening mood, paranoia, irritability and bizarre behavior is consistent with a Mood Disorder (Schizoaffective Disorder vs SIMD).  MARCUS chronically elevated given diagnoses of Schizoaffective Disorder, Borderline Personality Disorder, PTSD, history of ongoing substance use, and history of trauma which is acutely raised given reported SI, agitation, and irritability in the setting of substance use and treatment non-compliance. Protective factors that mitigate this risk include a capacity to advocate for self, concern for well-being, domiciled, and future-motivated.  VRA chronically elevated given history of aggression, Antisocial Personality Traits and ongoing substance use which is acutely elevated given acute psychiatric decompensation, treatment non-compliance, reports of vague HI, and ongoing substance use.  Given that the patient is unable to care for himself and is an acute risk to himself and others, an inpatient psychiatric hospitalization is warranted for safety, medication optimization and psychiatric stabilization.    Plan:  # Mood Disorder Unspecified (Schizoaffective Disorder vs SIMD):  # Cluster B Personality Traits (Borderline, Antisocial):  - start risperidone 1 mg PO BID  - hydroxyzine 25 mg PO TID PRN for anxiety   - can consider initiation of a mood stabilizer such as divalproex sodium  - Haldol 5 mg PO/IM, Lorazepam 2 mg PO/IM, Diphenhydramine 50 mg PO/IM for acute agitation    # Alcohol Use Disorder:  # Cocaine Use Disorder:  # Cannabis Use Disorder  - motivational interviewing   - referral to outpatient [facility] rehabilitation upon discharge 32 year old  male single, has one child in the custody of his ex-fiance, domiciled per patient in [place] apartment, unemployed with PMH Polysubstance Use Disorder (LSD, MDMA, Psilocybin, Cannabis, Alcohol, Cocaine, Dextromethorphan) and PPH Borderline Personality Disorder, Schizoaffective Disorder, Unspecified Anxiety Disorder, PTSD, multiple inpatient psychiatric hospitalizations, most recently at [facility] (08/09/21 – 08/10/21) for Schizoaffective Disorder Unspecified, not currently engaged in outpatient psychiatric treatment, per chart review a history of suicide attempts (cut wrists a few years ago, attempted hanging at age 7), no history of NSSIB, history of aggression including on unit during last admission (threw chairs), history of trauma presenting to [facility] complaining of AH and presenting with irritability, paranoid ideation, and bizarre behavior, in the setting of substance use (cocaine, psilocybin, cannabis) and medication non-compliance, requiring olanzapine 5 mg for agitation in the ED, admitted involuntarily to [address] given an inability to care for self and an acute danger to self and others.      On approach, the patient is sleeping quietly in his room and awakens to verbal stimuli.  On evaluation, the patient is irritable, uncooperative, becomes agitated and makes vague threats to interviewer, demonstrating poor behavioral control and grossly linear thought processes.  The patient is mostly non-contributory to the interview.  The patient's presentation of worsening mood, paranoia, irritability and bizarre behavior is consistent with a Mood Disorder (Schizoaffective Disorder vs SIMD).  MARCUS chronically elevated given diagnoses of Schizoaffective Disorder, Borderline Personality Disorder, PTSD, history of ongoing substance use, and history of trauma which is acutely raised given reported SI, agitation, and irritability in the setting of substance use and treatment non-compliance. Protective factors that mitigate this risk include a capacity to advocate for self, concern for well-being, domiciled, and future-motivated.  VRA chronically elevated given history of aggression, Antisocial Personality Traits and ongoing substance use which is acutely elevated given acute psychiatric decompensation, treatment non-compliance, reports of vague HI, and ongoing substance use.  Given that the patient is unable to care for himself and is an acute risk to himself and others, an inpatient psychiatric hospitalization is warranted for safety, medication optimization and psychiatric stabilization.    5/8: Patient reports improved mood, decreased in intensity of suicidal thoughts and denies AH. No reported difficulties with sleep. Compliant with meds with no side effects.     Plan:  # Mood Disorder Unspecified (Schizoaffective Disorder vs SIMD):  # Cluster B Personality Traits (Borderline, Antisocial):  - start risperidone 1 mg PO BID  - hydroxyzine 25 mg PO TID PRN for anxiety   - can consider initiation of a mood stabilizer such as divalproex sodium  - Haldol 5 mg PO/IM, Lorazepam 2 mg PO/IM, Diphenhydramine 50 mg PO/IM for acute agitation    # Alcohol Use Disorder:  # Cocaine Use Disorder:  # Cannabis Use Disorder  - motivational interviewing   - referral to outpatient [facility] rehabilitation upon discharge

## 2022-05-08 NOTE — BH INPATIENT PSYCHIATRY PROGRESS NOTE - NSICDXBHSECONDARYDX_PSY_ALL_CORE
Severe cannabis use disorder   F12.20  Borderline personality disorder in adult   F60.3  Antisocial personality disorder in adult   F60.2  Moderate alcohol use disorder   F10.20  Moderate cocaine use disorder   F14.20

## 2022-05-08 NOTE — BH INPATIENT PSYCHIATRY PROGRESS NOTE - NSBHCHARTREVIEWVS_PSY_A_CORE FT
Vital Signs Last 24 Hrs  T(C): 37.1 (05-08-22 @ 06:19), Max: 37.1 (05-08-22 @ 06:19)  T(F): 98.7 (05-08-22 @ 06:19), Max: 98.7 (05-08-22 @ 06:19)  HR: 59 (05-08-22 @ 06:19) (55 - 70)  BP: 93/57 (05-08-22 @ 06:19) (93/57 - 127/76)  BP(mean): --  RR: 18 (05-07-22 @ 20:05) (18 - 18)  SpO2: 98% (05-08-22 @ 06:19) (98% - 100%)     Vital Signs Last 24 Hrs  T(C): 36.4 (05-08-22 @ 08:17), Max: 37.1 (05-08-22 @ 06:19)  T(F): 97.5 (05-08-22 @ 08:17), Max: 98.7 (05-08-22 @ 06:19)  HR: 65 (05-08-22 @ 08:17) (59 - 70)  BP: 126/85 (05-08-22 @ 08:17) (93/57 - 127/76)  BP(mean): --  RR: 18 (05-08-22 @ 08:17) (18 - 18)  SpO2: 98% (05-08-22 @ 08:17) (98% - 98%)

## 2022-05-08 NOTE — BH INPATIENT PSYCHIATRY PROGRESS NOTE - CURRENT MEDICATION
MEDICATIONS  (STANDING):  risperiDONE   Tablet 1 milliGRAM(s) Oral two times a day    MEDICATIONS  (PRN):  diphenhydrAMINE 50 milliGRAM(s) Oral every 6 hours PRN agitation  diphenhydrAMINE Injectable 50 milliGRAM(s) IntraMuscular every 6 hours PRN Agitation  haloperidol     Tablet 5 milliGRAM(s) Oral every 6 hours PRN agitation  haloperidol    Injectable 5 milliGRAM(s) IntraMuscular every 6 hours PRN Agitation  hydrOXYzine hydrochloride 25 milliGRAM(s) Oral every 6 hours PRN anxiety  LORazepam     Tablet 2 milliGRAM(s) Oral every 6 hours PRN agitation  LORazepam   Injectable 2 milliGRAM(s) IntraMuscular every 4 hours PRN Agitation

## 2022-05-08 NOTE — BH INPATIENT PSYCHIATRY PROGRESS NOTE - NSBHFUPINTERVALHXFT_PSY_A_CORE
Chart reviewed and case discussed with nursing staff. No behavioral episodes reported. As per the nursing report, he has stayed mostly in his room. Patient seen in the conference room. reports feeling "ok." States that "voices" are gone but he continues to have suicidal thoughts. Reports that the thoughts are "less intense" and denies any intent or plan to harm himself. Reports that he slept "well" and he has a good appetite. hs been complaint with meds. Denies any emerging side effects. No EPS noted. Reports that risperidone is helpful "with voices" and is willing to continue taking it.  Chart reviewed and case discussed with nursing staff. No behavioral episodes reported. As per the nursing report, he has stayed mostly in his room. Patient seen in the conference room. reports feeling "ok." States that "voices are gone" but he continues to have suicidal thoughts. Reports that the thoughts are "less intense" and denies any intent or plan to harm himself. Reports that he slept "well" and he has a good appetite. hs been complaint with meds. Denies any emerging side effects. No EPS noted. Reports that risperidone is helpful "with voices" and is willing to continue taking it.

## 2022-05-09 VITALS
OXYGEN SATURATION: 98 % | RESPIRATION RATE: 18 BRPM | SYSTOLIC BLOOD PRESSURE: 119 MMHG | HEART RATE: 71 BPM | DIASTOLIC BLOOD PRESSURE: 81 MMHG | TEMPERATURE: 99 F

## 2022-05-09 PROCEDURE — 99239 HOSP IP/OBS DSCHRG MGMT >30: CPT

## 2022-05-09 RX ORDER — RISPERIDONE 4 MG/1
1 TABLET ORAL
Qty: 30 | Refills: 0
Start: 2022-05-09 | End: 2022-05-23

## 2022-05-09 RX ORDER — RISPERIDONE 4 MG/1
1 TABLET ORAL
Qty: 0 | Refills: 0 | DISCHARGE
Start: 2022-05-09

## 2022-05-09 RX ORDER — IBUPROFEN 200 MG
600 TABLET ORAL EVERY 6 HOURS
Refills: 0 | Status: DISCONTINUED | OUTPATIENT
Start: 2022-05-09 | End: 2022-05-09

## 2022-05-09 RX ADMIN — Medication 600 MILLIGRAM(S): at 00:18

## 2022-05-09 RX ADMIN — Medication 2 MILLIGRAM(S): at 12:37

## 2022-05-09 RX ADMIN — Medication 600 MILLIGRAM(S): at 01:00

## 2022-05-09 RX ADMIN — Medication 25 MILLIGRAM(S): at 03:17

## 2022-05-09 RX ADMIN — Medication 2 MILLIGRAM(S): at 04:04

## 2022-05-09 RX ADMIN — RISPERIDONE 1 MILLIGRAM(S): 4 TABLET ORAL at 10:29

## 2022-05-09 RX ADMIN — Medication 50 MILLIGRAM(S): at 10:31

## 2022-05-09 RX ADMIN — HALOPERIDOL DECANOATE 5 MILLIGRAM(S): 100 INJECTION INTRAMUSCULAR at 12:36

## 2022-05-09 RX ADMIN — Medication 50 MILLIGRAM(S): at 00:15

## 2022-05-09 NOTE — BH SOCIAL WORK INITIAL PSYCHOSOCIAL EVALUATION - NSBHHOUSE_PSY_ALL_CORE
Patient reports residing with friend at [facility] [address] [place] [address] [address] [place], [place].    Patient known to [facility]/CARE ID: [id number]; assigned to [facility], exit date: 12/7/21/Home alone

## 2022-05-09 NOTE — BH DISCHARGE NOTE NURSING/SOCIAL WORK/PSYCH REHAB - PATIENT PORTAL LINK FT
You can access the FollowMyHealth Patient Portal offered by [facility] by registering at the following website: [email or web address]. By joining [employer]’s FollowMyHealth portal, you will also be able to view your health information using other applications (apps) compatible with our system.

## 2022-05-09 NOTE — BH INPATIENT PSYCHIATRY DISCHARGE NOTE - NSDCMRMEDTOKEN_GEN_ALL_CORE_FT
RisperDAL 1 mg oral tablet: 1 tab(s) orally every 12 hours    risperiDONE 1 mg oral tablet: 1 tab(s) orally 2 times a day

## 2022-05-09 NOTE — BH DISCHARGE NOTE NURSING/SOCIAL WORK/PSYCH REHAB - NSCDUDCCRISIS_PSY_A_CORE
[facility] Well  1 (334) [facility]-WELL ([phone])  Text "WELL" to 83227  Website: www.[employer]/.[employer] [facility]iz[facility] 1 (570) 361-ICXT (1417) Website: www.safehorizon.org/.National Suicide Prevention Lifeline 6 (034) 525-3805/.  [employer]  1 (044) LIFENET (631-0071)/.  [facility]  [address] [place] [address] [phone]   Hours:  Monday through Friday from 9 AM to 3 PM/.  [facility] [facility] of  Affairs - Veterans Crisis Line  5 [phone], Option 1 [facility] Well  1 (990) [facility]-WELL ([phone])  Text "WELL" to 49376  Website: www.[employer]/.[facility] Horizons 1 (428) 901-OIDB (0299) Website: www.safehorizon.org/.National Suicide Prevention Lifeline 2 (965) 494-4139/.  [employer]  1 (177) LIFENET (885-6032)/.  [facility]’s Behavioral Health Crisis Center  [address] [place] [address] [phone]   Hours:  Monday through Friday from 9 AM to 3 PM [facility] Well  1 (236) Psychiatric hospital-WELL ([phone])  Text "WELL" to 48632  Website: www.[employer]/.Safe Horizons 1 (501) 661-JBTE (2211) Website: www.safehorizon.org/.National Suicide Prevention Lifeline 5 (384) 450-0965/.  [employer]  1 (258) LIFENET (086-6226)/.  [facility]’s Behavioral Health Crisis Center  [address] [place] [address] [phone]   Hours:  Monday through Friday from 9 AM to 3 PM/.  [employer] Dept of  Affairs - Veterans Crisis Line  [phone], Option 1

## 2022-05-09 NOTE — BH INPATIENT PSYCHIATRY DISCHARGE NOTE - DESCRIPTION
The patient was born and raised in the [place] by his mother and father.  The patient is reportedly domiciled.  The patient is currently unemployed.  The patient is otherwise non-contributory to the interview.

## 2022-05-09 NOTE — BH INPATIENT PSYCHIATRY DISCHARGE NOTE - NSDCCPCAREPLAN_GEN_ALL_CORE_FT
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

## 2022-05-09 NOTE — BH INPATIENT PSYCHIATRY DISCHARGE NOTE - OTHER PAST PSYCHIATRIC HISTORY (INCLUDE DETAILS REGARDING ONSET, COURSE OF ILLNESS, INPATIENT/OUTPATIENT TREATMENT)
PSYCKES:  MEDICAID ID: [id number]  [place] FOR SI: SI x3 (first on 07/20/20) most recently on 12/08/20 at [facility], Treatment for Self-Inflicted Poisoning (first on 07/23/20) and most recently on 07/31/20 at [facility]  QUALITY FLAGS: High Mental Health Need (1+ Inpt MH in past 12 months), [facility] Performance Tracking Measure as of 10/01/2021: Low Antipsychotic Medication Adherence - Schizophrenia  No Diabetes Screening - Schizophrenia/Bipolar on Antipsychotic  BEHAVIORAL HEALTH DIAGNOSES: Schizoaffective Disorder Borderline Personality Disorder PTSD Unspecified/Other Psychotic Disorders Unspecified/Other Anxiety Disorder Unspecified/Other Bipolar Other Mental Disorders Delusional Disorder Major Depressive Disorder Schizophrenia Unspecified/Other Depressive Disorder  BEHAVIORAL HEALTH MEDICATIONS: Divalproex Sodium 250 mg PO BID (01/27/21), Gabapentin 300 mg PO daily (12/31/20 – 01/27/21), Lorazepam 0.5 mg PO daily (01/20/21), Risperidone 3 mg PO BID (12/07/20 – 01/20/21), Hydroxyzine HCl 10 mg PO daily (12/31/20), Lithium Carbonate  mg PO BID (12/07/20 – 12/31/20), Escitalopram Oxalate 10 mg PO daily (06/07/18 – 07/25/18).    OUTPATIENT: Carolyn Segundo NP (11/09/21) for Anxiety Disorder Unspecified, [facility] (12/14/20 – 01/14/21; 11/09/21) for Anxiety Disorder Unspecified, [facility] (12/22/20 – 05/13/21) for MDD single episode moderate, [facility] (07/22/20) for Unspecified Psychosis not due to a substance or known physiological condition, [facility]  (06/07/18 – 08/07/18) for Mental Disorder Not Otherwise Specified.  INPATIENT:  x3 most recently at [facility] (08/09/21 – 08/10/21) for Schizoaffective Disorder Unspecified, [facility] (11/26/20 – 12/08/20) for Schizoaffective Disorder Bipolar Type, [facility] (07/23/20 – 07/31/20) for Unspecified Psychosis not due to a substance or known physiological condition  ED: MH x4 most recently at [facility] (07/22/20 – 07/23/20) for Unspecified Psychosis Not Due To A Substance Or Known Physiological Condition, [facility] (07/20/20) for SI, [facility] (01/21/18)  Crisis Residential Services: [facility] (12/04/21, 12/31/21) for Borderline Personality Disorder, [facility] (02/15/21) for Schizoaffective Disorder Depressive Type    ISTOP:  Reference #: [id number]  No Records Found.

## 2022-05-09 NOTE — BH INPATIENT PSYCHIATRY DISCHARGE NOTE - HOSPITAL COURSE
Per progress note on 5/8/22: No behavioral episodes reported. As per the nursing report, he has stayed mostly in his room. Patient seen in the conference room. reports feeling "ok." States that "voices are gone" but he continues to have suicidal thoughts. Reports that the thoughts are "less intense" and denies any intent or plan to harm himself. Reports that he slept "well" and he has a good appetite. hs been complaint with meds. Denies any emerging side effects. No EPS noted. Reports that risperidone is helpful "with voices" and is willing to continue taking it.    5/9/22: Patient was seen in his room by this writer, Dr. Kunz and other members of the treatment team. He was calm, pleasant and cooperative. When asked about his reason to come to the hospital, he referred to an overdose that took place about a year ago and said it was accidental. He spoke about the plan that was made at his discharge from the hospital which included taking Risperdal, talking to his therapist, taking walks. He said that he stopped adhering to the plan and started relying on smoking. He reported smoking marijuana regularly and occasional alcohol and cocaine use. He did not identify these as problems that require treatment at the moment and remarked, "I dont see them as problems because I can stop whenever I want to." Patient was asked about his presentation to the ER prior to this admission. He reported feeling "unsafe and nervous", he endorsed hearing voices and feeling irritable. He reported a negative experiences in an acquaintances apartment: "She wanted to have sex with me, then invited dawson people who were mocking me. So I did cocaine and had sex with her. I did not event want to that, it affected me so I decided come to the hospitals and talk to professionals."     Patient refused feeling unsafe or nervous at the moment. He denied hearing voices or seeing things that other people do not notice. He endorses feeling "good". He denies thoughts about harming himself or others. 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    Assessment/Plan:  Patient is a 33 y/o AA M, per 2020 notes single, noncaregiver, undomiciled, unemployed, at that time 1 yr old child in custody of his ex-fiance, with PPhx unspecified anxiety disorder, polysubstance use (LSD, MDMA, psilocybin, MJ, ETOH, cocaine, dextromethorphan), last known admission at [facility] in 020, in 2020 reported prior suicide attempts (cut wrists a few years ago, attempted hanging at age 7), +hx of aggression including on unit during last admission (threw chairs), reported legal problems in [place]? in 2020, but denies arrests in the [place], and denies significant PMhx who presents with paranoid ideation and bizarre behavior in the context of substance abuse. Patient used cocaine, mushrooms, and marijuana and was shown to have positive utox, His symptoms went into remission during the 2 days he spent in the inpatient unit. He has been in great behavioral control, calm, pleasant  and cooperative. He is currently not endorsing paranoid ideation, voice hearing, SI/HI and engages well with safety planning. He is euthymic and future oriented with goals such as going back to work, adhering to Risperdal treatment and following up with outpatient care. He is not at an acutely elevated safety risk towards himself or others and can be safely discharged back to his previous living conditions. Per progress note on 5/8/22: No behavioral episodes reported. As per the nursing report, he has stayed mostly in his room. Patient seen in the conference room. reports feeling "ok." States that "voices are gone" but he continues to have suicidal thoughts. Reports that the thoughts are "less intense" and denies any intent or plan to harm himself. Reports that he slept "well" and he has a good appetite. hs been complaint with meds. Denies any emerging side effects. No EPS noted. Reports that risperidone is helpful "with voices" and is willing to continue taking it.    5/9/22: Patient was seen in his room by this writer, Dr. Kunz and other members of the treatment team. He was calm, pleasant and cooperative. When asked about his reason to come to the hospital, he referred to an overdose that took place about a year ago and said it was accidental. He spoke about the plan that was made at his discharge from the hospital which included taking Risperdal, talking to his therapist, taking walks. He said that he stopped adhering to the plan and started relying on smoking. He reported smoking marijuana regularly and occasional alcohol and cocaine use. He did not identify these as problems that require treatment at the moment and remarked, "I dont see them as problems because I can stop whenever I want to." Patient was asked about his presentation to the ER prior to this admission. He reported feeling "unsafe and nervous", he endorsed hearing voices and feeling irritable. He reported a negative experiences in an acquaintances apartment: "She wanted to have sex with me, then invited dawson people who were mocking me. So I did cocaine and had sex with her. I did not event want to that, it affected me so I decided come to the hospitals and talk to professionals."     Patient refused feeling unsafe or nervous at the moment. He denied hearing voices or seeing things that other people do not notice. He endorses feeling "good". He denies thoughts about harming himself or others. 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    Assessment/Plan:  Patient is a 31 y/o AA M, per 2020 notes single, noncaregiver, undomiciled, unemployed, at that time 1 yr old child in custody of his ex-fiance, with PPhx unspecified anxiety disorder, polysubstance use (LSD, MDMA, psilocybin, MJ, ETOH, cocaine, dextromethorphan), last known admission at [facility] in 020, in 2020 reported prior suicide attempts (cut wrists a few years ago, attempted hanging at age 7), +hx of aggression including on unit during last admission (threw chairs), reported legal problems in [place]? in 2020, but denies arrests in the [place], and denies significant PMhx who presents with paranoid ideation and bizarre behavior in the context of substance abuse. Patient used cocaine, mushrooms, and marijuana and was shown to have positive utox, His symptoms went into remission during the 2 days he spent in the inpatient unit. He has been in great behavioral control, calm, pleasant  and cooperative. He is currently not endorsing paranoid ideation, voice hearing, SI/HI and engages well with safety planning. He is euthymic and future oriented with goals such as going back to work, adhering to Risperdal treatment and following up with outpatient care. He is not at an acutely elevated safety risk towards himself or others and can be safely discharged back to his previous living conditions.    Pt given risperdal 1mg twice a day for mood dysregulation and psychosis (30- tabs)

## 2022-05-09 NOTE — BH INPATIENT PSYCHIATRY DISCHARGE NOTE - CASE SUMMARY
Pt put in 72 hour letter and eager for discharge. Told him he might benefit more from longer stay but he wants to leave.     MSE-Well groomed and fairly related, good EC. -PMR/A Speech: wnl Mood:" Great" Affect: expansive, full-range TP: circumstantial TC: -SI/HI/AH/VH/PI> I&J: fair.    Pt wanted to go to [facility]-up but they have closed his case due to nonadherence. Referred to [facility].

## 2022-05-09 NOTE — CHART NOTE - NSCHARTNOTEFT_GEN_A_CORE
[facility]  PHYSICAL EXAM: Agree/Declined    VITALS: T(C): --  HR: --  BP: --  RR: --  SpO2: --      GENERAL: NAD, comfortable, ambulating  HEAD:  Atraumatic, Normocephalic  EYES: EOMI, PERRLA, conjunctiva and sclera clear  ENT: Moist mucous membranes  NECK: Supple, No JVD  CHEST/LUNG: Clear to auscultation bilaterally; No rales, rhonchi, wheezing, or rubs. Unlabored respirations  HEART: Regular rate and rhythm; No murmurs, rubs, or gallops  ABDOMEN: BSx4; Soft, nontender, nondistended  EXTREMITIES:  No clubbing, cyanosis, or edema  NERVOUS SYSTEM:  A&Ox3, no focal deficits   SKIN: No rashes or lesions

## 2022-05-09 NOTE — BH SOCIAL WORK INITIAL PSYCHOSOCIAL EVALUATION - NSBHBARRIERS_PSY_ALL_CORE
Financial difficulties/Lack of support/Lack of insight/Low motivation/Non-compliant with treatment/Poor judgement

## 2022-05-09 NOTE — BH INPATIENT PSYCHIATRY DISCHARGE NOTE - HPI (INCLUDE ILLNESS QUALITY, SEVERITY, DURATION, TIMING, CONTEXT, MODIFYING FACTORS, ASSOCIATED SIGNS AND SYMPTOMS)
32 year old  male single, has one child in the custody of his ex-fiance, domiciled per patient in [place] apartment, unemployed with PMH Polysubstance Use Disorder (LSD, MDMA, Psilocybin, Cannabis, Alcohol, Cocaine, Dextromethorphan) and PPH Borderline Personality Disorder, Schizoaffective Disorder, Unspecified Anxiety Disorder, PTSD, multiple inpatient psychiatric hospitalizations, most recently at [facility] (08/09/21 – 08/10/21) for Schizoaffective Disorder Unspecified, not currently engaged in outpatient psychiatric treatment, per chart review a history of suicide attempts (cut wrists a few years ago, attempted hanging at age 7), no history of NSSIB, history of aggression including on unit during last admission (threw chairs), history of trauma presenting to [facility] complaining of AH and presenting with irritability, paranoid ideation, and bizarre behavior, in the setting of substance use (cocaine, psilocybin, cannabis) and medication non-compliance, requiring olanzapine 5 mg for agitation in the ED, admitted involuntarily to [address] given an inability to care for self and an acute danger to self and others.      On approach, the patient is sleeping quietly in his room and awakens to verbal stimuli.  On evaluation, the patient is irritable, uncooperative, becomes agitated and makes vague threats to interviewer, demonstrating poor behavioral control and grossly linear thought processes.  The patient states that he is "not well" and that he has been "feeling low every day."  The patient states that he is "depressed, anxious, weak, inferior and nervous."  The patient states that he experiences intermittent insomnia and anhedonia but reports good energy and appetite and denies feelings of guilt.  The patient denies SI, intent and plan but states that he "has a feeling [he] just can't shake."  The patient denies AVH and no paranoia or delusions are reported or elicited on interview.  The patient states that he "has no psychiatric diagnoses" and that "its your thing and it should be in the chart."  The patient states he "may have been hospitalized a couple of times, maybe in [facility]."  The patient denies any current outpatient psychiatric treatment and he states that he has been on risperidone in the past as well as Lithium but he stopped the latter due to side effects which he refuses to elaborate on.  The patient states "I may have had a few suicide attempts but I don't remember."  The patient states that he uses cannabis daily and he states that he has used "mushrooms in the past."  The patient states that he was raised in the [place] by his mother and father and then states that he "lives in an apartment with, you know, people" and he refuses to elaborate further.  The patient becomes more agitated, and sits up at the edge of the bed where the interviewer is standing and states that he "had a job but luckily [he] doesn't work there anymore because [he] is having some thoughts and where [he] worked had lots of sharp knives that [he] feels [he] wants to hurt someone with."  The patient replies no to every other question asked and he abruptly ends the interview.  All questions and concerns addressed.     Per ED Behavioral Health Assessment Note (05/06/22):   Patient is a 31 y/o AA M, per 2020 notes single, noncaregiver, undomiciled, unemployed, at that time 1 yr old child in custody of his ex-fiance, with PPhx unspecified anxiety disorder, polysubstance use (LSD, MDMA, psilocybin, MJ, ETOH, cocaine, dextromethorphan), last known admission at [facility] in 020, in 2020 reported prior suicide attempts (cut wrists a few years ago, attempted hanging at age 7), +hx of aggression including on unit during last admission (threw chairs), reported legal problems in [place]? in 2020, but denies arrests in the [place], and denies significant PMhx consult called to evaluate AH.     The patient was given 5mg Zyprexa by mouth on 5/6/2022 at 12:01PM.    On re-evaluation, the patient was initially calm, inappropriately smiling, gesturing to people outside the room. During the course of the interview, he became increasingly agitated, requesting to leave, refusing to answer all questions.    The patient states that for the past several days, he has been using cocaine, mushrooms, weed. Yesterday he was feeling "nervous, scared, weak, inferior, inadequate, and targeted." When asked to clarify, he became very vague, saying that "it should be in the chart." He admitted to being hospitalized at [facility] and found it helpful. He used to take Risperdal but stopped "a long time ago" and that he found it helpful when he used to take it. In between answering questions, he would make comments about feeling people outside were all connected and that he didn't trust anyone. He said that his felings were "colliding with thoughts" and "creating a reality different from everyone else's." He denied any AVH but does admit to feeling very paranoid and targeted. He declined to answer whether or not he had any SI or HI. He denied having any collateral contacts to talk to. He refused to engage with the rest of the interview and did not answer COVID screening questions.    MSE: average build, fair hygiene and grooming, uncooperative behavior, psychomotor agitated, poor eye contact and poor relatedness, impaired impulsve control, loud speech, anxious and irritable mood, expansive affect, paranoid thought content, denying perceptual disturbances, poor insight and poor judgment.

## 2022-05-09 NOTE — BH SOCIAL WORK INITIAL PSYCHOSOCIAL EVALUATION - NSBHLEGALCURRENTDETAILS_PSY_ALL_CORE
[place], issued bench warrant, did not appear to court, missed deadline for alternative to incarceration program. Patient counseled to follow up for alternative to incarceration programs and warrant at local precinct.

## 2022-05-09 NOTE — BH INPATIENT PSYCHIATRY DISCHARGE NOTE - REASON FOR ADMISSION
Patient was found to be in need of psychiatric hospitalization because he did not fully engage in risk assessment and was found to have a history of prior overdose, which raised concerns over ability to care for self and safety concerns towards self and others.

## 2022-05-14 DIAGNOSIS — F12.20 CANNABIS DEPENDENCE, UNCOMPLICATED: ICD-10-CM

## 2022-05-14 DIAGNOSIS — F28 OTHER PSYCHOTIC DISORDER NOT DUE TO A SUBSTANCE OR KNOWN PHYSIOLOGICAL CONDITION: ICD-10-CM

## 2022-05-14 DIAGNOSIS — Z91.51 PERSONAL HISTORY OF SUICIDAL BEHAVIOR: ICD-10-CM

## 2022-05-14 DIAGNOSIS — R45.851 SUICIDAL IDEATIONS: ICD-10-CM

## 2022-05-14 DIAGNOSIS — F17.210 NICOTINE DEPENDENCE, CIGARETTES, UNCOMPLICATED: ICD-10-CM

## 2022-05-14 DIAGNOSIS — F10.20 ALCOHOL DEPENDENCE, UNCOMPLICATED: ICD-10-CM

## 2022-05-14 DIAGNOSIS — Z91.128 PATIENT'S INTENTIONAL UNDERDOSING OF MEDICATION REGIMEN FOR OTHER REASON: ICD-10-CM

## 2022-05-14 DIAGNOSIS — T43.596A UNDERDOSING OF OTHER ANTIPSYCHOTICS AND NEUROLEPTICS, INITIAL ENCOUNTER: ICD-10-CM

## 2022-05-14 DIAGNOSIS — F14.20 COCAINE DEPENDENCE, UNCOMPLICATED: ICD-10-CM

## 2022-05-14 DIAGNOSIS — F60.3 BORDERLINE PERSONALITY DISORDER: ICD-10-CM

## 2022-05-14 DIAGNOSIS — F60.2 ANTISOCIAL PERSONALITY DISORDER: ICD-10-CM

## 2022-05-29 NOTE — ED BEHAVIORAL HEALTH ASSESSMENT NOTE - RISK ASSESSMENT
08-Oct-2021 Risk factors include suicide attempt today, suspected access to firearms, not in outpatient treatment, psychotic episode, unable to engage in safety planning. High Acute Suicide Risk

## 2022-05-30 PROCEDURE — 80307 DRUG TEST PRSMV CHEM ANLYZR: CPT

## 2022-05-30 PROCEDURE — 99285 EMERGENCY DEPT VISIT HI MDM: CPT

## 2022-05-30 PROCEDURE — 80053 COMPREHEN METABOLIC PANEL: CPT

## 2022-05-30 PROCEDURE — 85027 COMPLETE CBC AUTOMATED: CPT

## 2022-05-30 PROCEDURE — 87635 SARS-COV-2 COVID-19 AMP PRB: CPT

## 2022-05-30 PROCEDURE — 36415 COLL VENOUS BLD VENIPUNCTURE: CPT

## 2022-10-11 NOTE — CONSULT NOTE ADULT - CONSULT REASON
Received a seven page plan of care order from [facility] home. Certification period from 09/03/22 to 11/01/22. Per fax cover sheet: \"This is an old order that we have failed to get signed. Please have provider sign/date and return.\" Per PCP, okay to stamp sign. Stamp signed all pages and faxed all back to [facility] home. Confirmation fax received.   elevated cpk

## 2024-09-07 NOTE — BH SOCIAL WORK INITIAL PSYCHOSOCIAL EVALUATION - NSBHSUBSTUSED_PSY_A_CORE
[FreeTextEntry1] : Review of Laboratory Evaluation 07/2024 TSH 5.04 (0.50-4.30)-high, fT4 1 (0.8-1.4)  TPO Abs 1 (<9), Thyroglobulin Ab 9 (< or = to 1)-high CBCd- WBC 4 (4.5-13)-low, ANC 1444 (4759-2730) -low , otherwise normal  CMP: BG 95, no transaminitis  Lipid Panel: , HDL 52, ,    Review of imaging 7/29/2024 Thyroid US  Thyroid gland with uniform echogenecity  Upper pole of Right thyroid measuring 3 mm with a central ill-defined echogenic focus likely representing a colloid cyst. No discrete nodules or calcifications  Prominent LN at Level 2 on the left measuring 2.5X0.8X1.5 cm , likely reactive   Review of Growth Chart (points from 12.5 to 14 y/o available from PMD) Height: height leveled off  Weight : Unable to see percentiles on growth chart well but appears to be relatively stable   Alcohol/Cannabis/Cocaine/Crack
